# Patient Record
Sex: MALE | Race: WHITE | NOT HISPANIC OR LATINO
[De-identification: names, ages, dates, MRNs, and addresses within clinical notes are randomized per-mention and may not be internally consistent; named-entity substitution may affect disease eponyms.]

---

## 2023-08-27 ENCOUNTER — TRANSCRIPTION ENCOUNTER (OUTPATIENT)
Age: 63
End: 2023-08-27

## 2023-08-27 ENCOUNTER — INPATIENT (INPATIENT)
Facility: HOSPITAL | Age: 63
LOS: 2 days | Discharge: HOME | DRG: 660 | End: 2023-08-30
Attending: STUDENT IN AN ORGANIZED HEALTH CARE EDUCATION/TRAINING PROGRAM | Admitting: STUDENT IN AN ORGANIZED HEALTH CARE EDUCATION/TRAINING PROGRAM
Payer: MEDICAID

## 2023-08-27 VITALS
HEART RATE: 177 BPM | DIASTOLIC BLOOD PRESSURE: 57 MMHG | SYSTOLIC BLOOD PRESSURE: 94 MMHG | WEIGHT: 179.9 LBS | RESPIRATION RATE: 19 BRPM | OXYGEN SATURATION: 98 %

## 2023-08-27 DIAGNOSIS — I10 ESSENTIAL (PRIMARY) HYPERTENSION: ICD-10-CM

## 2023-08-27 DIAGNOSIS — Z29.9 ENCOUNTER FOR PROPHYLACTIC MEASURES, UNSPECIFIED: ICD-10-CM

## 2023-08-27 DIAGNOSIS — I20.0 UNSTABLE ANGINA: ICD-10-CM

## 2023-08-27 DIAGNOSIS — N20.0 CALCULUS OF KIDNEY: ICD-10-CM

## 2023-08-27 DIAGNOSIS — E78.5 HYPERLIPIDEMIA, UNSPECIFIED: ICD-10-CM

## 2023-08-27 DIAGNOSIS — N17.9 ACUTE KIDNEY FAILURE, UNSPECIFIED: ICD-10-CM

## 2023-08-27 LAB
ALBUMIN SERPL ELPH-MCNC: 3.9 G/DL — SIGNIFICANT CHANGE UP (ref 3.5–5)
ALP SERPL-CCNC: 140 U/L — HIGH (ref 40–120)
ALT FLD-CCNC: 136 U/L DA — HIGH (ref 10–60)
ANION GAP SERPL CALC-SCNC: 13 MMOL/L — SIGNIFICANT CHANGE UP (ref 5–17)
APPEARANCE UR: CLEAR — SIGNIFICANT CHANGE UP
APTT BLD: 41.8 SEC — HIGH (ref 24.5–35.6)
AST SERPL-CCNC: 109 U/L — HIGH (ref 10–40)
BACTERIA # UR AUTO: ABNORMAL /HPF
BASOPHILS # BLD AUTO: 0.04 K/UL — SIGNIFICANT CHANGE UP (ref 0–0.2)
BASOPHILS NFR BLD AUTO: 0.3 % — SIGNIFICANT CHANGE UP (ref 0–2)
BILIRUB SERPL-MCNC: 1.2 MG/DL — SIGNIFICANT CHANGE UP (ref 0.2–1.2)
BILIRUB UR-MCNC: NEGATIVE — SIGNIFICANT CHANGE UP
BUN SERPL-MCNC: 21 MG/DL — HIGH (ref 7–18)
CALCIUM SERPL-MCNC: 9.9 MG/DL — SIGNIFICANT CHANGE UP (ref 8.4–10.5)
CHLORIDE SERPL-SCNC: 102 MMOL/L — SIGNIFICANT CHANGE UP (ref 96–108)
CO2 SERPL-SCNC: 20 MMOL/L — LOW (ref 22–31)
COLOR SPEC: YELLOW — SIGNIFICANT CHANGE UP
CREAT SERPL-MCNC: 2.32 MG/DL — HIGH (ref 0.5–1.3)
DIFF PNL FLD: NEGATIVE — SIGNIFICANT CHANGE UP
EGFR: 31 ML/MIN/1.73M2 — LOW
EOSINOPHIL # BLD AUTO: 0.06 K/UL — SIGNIFICANT CHANGE UP (ref 0–0.5)
EOSINOPHIL NFR BLD AUTO: 0.4 % — SIGNIFICANT CHANGE UP (ref 0–6)
FLUAV AG NPH QL: SIGNIFICANT CHANGE UP
FLUBV AG NPH QL: SIGNIFICANT CHANGE UP
GLUCOSE SERPL-MCNC: 147 MG/DL — HIGH (ref 70–99)
GLUCOSE UR QL: NEGATIVE MG/DL — SIGNIFICANT CHANGE UP
HCT VFR BLD CALC: 51.6 % — HIGH (ref 39–50)
HGB BLD-MCNC: 17 G/DL — SIGNIFICANT CHANGE UP (ref 13–17)
IMM GRANULOCYTES NFR BLD AUTO: 0.3 % — SIGNIFICANT CHANGE UP (ref 0–0.9)
INR BLD: 0.94 RATIO — SIGNIFICANT CHANGE UP (ref 0.85–1.18)
KETONES UR-MCNC: 15 MG/DL
LEUKOCYTE ESTERASE UR-ACNC: NEGATIVE — SIGNIFICANT CHANGE UP
LYMPHOCYTES # BLD AUTO: 24.5 % — SIGNIFICANT CHANGE UP (ref 13–44)
LYMPHOCYTES # BLD AUTO: 3.58 K/UL — HIGH (ref 1–3.3)
MAGNESIUM SERPL-MCNC: 2.1 MG/DL — SIGNIFICANT CHANGE UP (ref 1.6–2.6)
MCHC RBC-ENTMCNC: 27.4 PG — SIGNIFICANT CHANGE UP (ref 27–34)
MCHC RBC-ENTMCNC: 32.9 GM/DL — SIGNIFICANT CHANGE UP (ref 32–36)
MCV RBC AUTO: 83.2 FL — SIGNIFICANT CHANGE UP (ref 80–100)
MONOCYTES # BLD AUTO: 1.12 K/UL — HIGH (ref 0–0.9)
MONOCYTES NFR BLD AUTO: 7.7 % — SIGNIFICANT CHANGE UP (ref 2–14)
NEUTROPHILS # BLD AUTO: 9.77 K/UL — HIGH (ref 1.8–7.4)
NEUTROPHILS NFR BLD AUTO: 66.8 % — SIGNIFICANT CHANGE UP (ref 43–77)
NITRITE UR-MCNC: NEGATIVE — SIGNIFICANT CHANGE UP
NRBC # BLD: 0 /100 WBCS — SIGNIFICANT CHANGE UP (ref 0–0)
NT-PROBNP SERPL-SCNC: 23 PG/ML — SIGNIFICANT CHANGE UP (ref 0–125)
PH UR: 5 — SIGNIFICANT CHANGE UP (ref 5–8)
PHOSPHATE SERPL-MCNC: 2.3 MG/DL — LOW (ref 2.5–4.5)
PLATELET # BLD AUTO: 331 K/UL — SIGNIFICANT CHANGE UP (ref 150–400)
POTASSIUM SERPL-MCNC: 4.2 MMOL/L — SIGNIFICANT CHANGE UP (ref 3.5–5.3)
POTASSIUM SERPL-SCNC: 4.2 MMOL/L — SIGNIFICANT CHANGE UP (ref 3.5–5.3)
PROT SERPL-MCNC: 8.8 G/DL — HIGH (ref 6–8.3)
PROT UR-MCNC: ABNORMAL MG/DL
PROTHROM AB SERPL-ACNC: 10.7 SEC — SIGNIFICANT CHANGE UP (ref 9.5–13)
RBC # BLD: 6.2 M/UL — HIGH (ref 4.2–5.8)
RBC # FLD: 13.8 % — SIGNIFICANT CHANGE UP (ref 10.3–14.5)
RBC CASTS # UR COMP ASSIST: 2 /HPF — SIGNIFICANT CHANGE UP (ref 0–4)
SARS-COV-2 RNA SPEC QL NAA+PROBE: SIGNIFICANT CHANGE UP
SODIUM SERPL-SCNC: 135 MMOL/L — SIGNIFICANT CHANGE UP (ref 135–145)
SP GR SPEC: 1.03 — SIGNIFICANT CHANGE UP (ref 1–1.03)
TROPONIN I, HIGH SENSITIVITY RESULT: 12.9 NG/L — SIGNIFICANT CHANGE UP
TROPONIN I, HIGH SENSITIVITY RESULT: 20 NG/L — SIGNIFICANT CHANGE UP
TROPONIN I, HIGH SENSITIVITY RESULT: 4.9 NG/L — SIGNIFICANT CHANGE UP
TSH SERPL-MCNC: 1.58 UU/ML — SIGNIFICANT CHANGE UP (ref 0.34–4.82)
UROBILINOGEN FLD QL: 1 MG/DL — SIGNIFICANT CHANGE UP (ref 0.2–1)
WBC # BLD: 14.62 K/UL — HIGH (ref 3.8–10.5)
WBC # FLD AUTO: 14.62 K/UL — HIGH (ref 3.8–10.5)
WBC UR QL: 2 /HPF — SIGNIFICANT CHANGE UP (ref 0–5)

## 2023-08-27 PROCEDURE — 99222 1ST HOSP IP/OBS MODERATE 55: CPT

## 2023-08-27 PROCEDURE — 99223 1ST HOSP IP/OBS HIGH 75: CPT | Mod: GC

## 2023-08-27 PROCEDURE — 99285 EMERGENCY DEPT VISIT HI MDM: CPT

## 2023-08-27 PROCEDURE — 71045 X-RAY EXAM CHEST 1 VIEW: CPT | Mod: 26

## 2023-08-27 PROCEDURE — 76775 US EXAM ABDO BACK WALL LIM: CPT | Mod: 26

## 2023-08-27 PROCEDURE — 74176 CT ABD & PELVIS W/O CONTRAST: CPT | Mod: 26

## 2023-08-27 PROCEDURE — 93010 ELECTROCARDIOGRAM REPORT: CPT

## 2023-08-27 RX ORDER — POLYETHYLENE GLYCOL 3350 17 G/17G
17 POWDER, FOR SOLUTION ORAL DAILY
Refills: 0 | Status: DISCONTINUED | OUTPATIENT
Start: 2023-08-27 | End: 2023-08-30

## 2023-08-27 RX ORDER — NALOXONE HYDROCHLORIDE 4 MG/.1ML
0.4 SPRAY NASAL ONCE
Refills: 0 | Status: DISCONTINUED | OUTPATIENT
Start: 2023-08-27 | End: 2023-08-30

## 2023-08-27 RX ORDER — SODIUM CHLORIDE 9 MG/ML
1000 INJECTION, SOLUTION INTRAVENOUS
Refills: 0 | Status: DISCONTINUED | OUTPATIENT
Start: 2023-08-27 | End: 2023-08-30

## 2023-08-27 RX ORDER — HEPARIN SODIUM 5000 [USP'U]/ML
5000 INJECTION INTRAVENOUS; SUBCUTANEOUS EVERY 12 HOURS
Refills: 0 | Status: DISCONTINUED | OUTPATIENT
Start: 2023-08-27 | End: 2023-08-28

## 2023-08-27 RX ORDER — ACETAMINOPHEN 500 MG
975 TABLET ORAL ONCE
Refills: 0 | Status: COMPLETED | OUTPATIENT
Start: 2023-08-27 | End: 2023-08-27

## 2023-08-27 RX ORDER — HYDROMORPHONE HYDROCHLORIDE 2 MG/ML
0.2 INJECTION INTRAMUSCULAR; INTRAVENOUS; SUBCUTANEOUS EVERY 4 HOURS
Refills: 0 | Status: DISCONTINUED | OUTPATIENT
Start: 2023-08-27 | End: 2023-08-29

## 2023-08-27 RX ORDER — LANOLIN ALCOHOL/MO/W.PET/CERES
3 CREAM (GRAM) TOPICAL AT BEDTIME
Refills: 0 | Status: DISCONTINUED | OUTPATIENT
Start: 2023-08-27 | End: 2023-08-30

## 2023-08-27 RX ORDER — METOPROLOL TARTRATE 50 MG
100 TABLET ORAL
Refills: 0 | Status: DISCONTINUED | OUTPATIENT
Start: 2023-08-27 | End: 2023-08-30

## 2023-08-27 RX ORDER — HYDROMORPHONE HYDROCHLORIDE 2 MG/ML
0.5 INJECTION INTRAMUSCULAR; INTRAVENOUS; SUBCUTANEOUS EVERY 4 HOURS
Refills: 0 | Status: DISCONTINUED | OUTPATIENT
Start: 2023-08-27 | End: 2023-08-29

## 2023-08-27 RX ORDER — ENOXAPARIN SODIUM 100 MG/ML
40 INJECTION SUBCUTANEOUS EVERY 24 HOURS
Refills: 0 | Status: DISCONTINUED | OUTPATIENT
Start: 2023-08-27 | End: 2023-08-27

## 2023-08-27 RX ORDER — MORPHINE SULFATE 50 MG/1
4 CAPSULE, EXTENDED RELEASE ORAL ONCE
Refills: 0 | Status: DISCONTINUED | OUTPATIENT
Start: 2023-08-27 | End: 2023-08-27

## 2023-08-27 RX ORDER — KETOROLAC TROMETHAMINE 30 MG/ML
15 SYRINGE (ML) INJECTION ONCE
Refills: 0 | Status: DISCONTINUED | OUTPATIENT
Start: 2023-08-27 | End: 2023-08-27

## 2023-08-27 RX ORDER — LOSARTAN POTASSIUM 100 MG/1
25 TABLET, FILM COATED ORAL DAILY
Refills: 0 | Status: DISCONTINUED | OUTPATIENT
Start: 2023-08-27 | End: 2023-08-27

## 2023-08-27 RX ORDER — SODIUM CHLORIDE 9 MG/ML
1000 INJECTION INTRAMUSCULAR; INTRAVENOUS; SUBCUTANEOUS ONCE
Refills: 0 | Status: COMPLETED | OUTPATIENT
Start: 2023-08-27 | End: 2023-08-27

## 2023-08-27 RX ORDER — TAMSULOSIN HYDROCHLORIDE 0.4 MG/1
0.4 CAPSULE ORAL AT BEDTIME
Refills: 0 | Status: DISCONTINUED | OUTPATIENT
Start: 2023-08-27 | End: 2023-08-30

## 2023-08-27 RX ORDER — ACETAMINOPHEN 500 MG
1000 TABLET ORAL ONCE
Refills: 0 | Status: COMPLETED | OUTPATIENT
Start: 2023-08-27 | End: 2023-08-27

## 2023-08-27 RX ORDER — ASPIRIN/CALCIUM CARB/MAGNESIUM 324 MG
81 TABLET ORAL DAILY
Refills: 0 | Status: DISCONTINUED | OUTPATIENT
Start: 2023-08-27 | End: 2023-08-28

## 2023-08-27 RX ORDER — SENNA PLUS 8.6 MG/1
2 TABLET ORAL AT BEDTIME
Refills: 0 | Status: DISCONTINUED | OUTPATIENT
Start: 2023-08-27 | End: 2023-08-30

## 2023-08-27 RX ORDER — ATORVASTATIN CALCIUM 80 MG/1
10 TABLET, FILM COATED ORAL AT BEDTIME
Refills: 0 | Status: DISCONTINUED | OUTPATIENT
Start: 2023-08-27 | End: 2023-08-30

## 2023-08-27 RX ORDER — METOPROLOL TARTRATE 50 MG
50 TABLET ORAL
Refills: 0 | Status: DISCONTINUED | OUTPATIENT
Start: 2023-08-27 | End: 2023-08-27

## 2023-08-27 RX ORDER — ACETAMINOPHEN 500 MG
650 TABLET ORAL EVERY 6 HOURS
Refills: 0 | Status: DISCONTINUED | OUTPATIENT
Start: 2023-08-27 | End: 2023-08-30

## 2023-08-27 RX ORDER — ONDANSETRON 8 MG/1
4 TABLET, FILM COATED ORAL EVERY 8 HOURS
Refills: 0 | Status: DISCONTINUED | OUTPATIENT
Start: 2023-08-27 | End: 2023-08-30

## 2023-08-27 RX ADMIN — Medication 100 MILLIGRAM(S): at 17:13

## 2023-08-27 RX ADMIN — Medication 1000 MILLIGRAM(S): at 16:56

## 2023-08-27 RX ADMIN — ATORVASTATIN CALCIUM 10 MILLIGRAM(S): 80 TABLET, FILM COATED ORAL at 21:52

## 2023-08-27 RX ADMIN — HYDROMORPHONE HYDROCHLORIDE 0.5 MILLIGRAM(S): 2 INJECTION INTRAMUSCULAR; INTRAVENOUS; SUBCUTANEOUS at 23:12

## 2023-08-27 RX ADMIN — SODIUM CHLORIDE 100 MILLILITER(S): 9 INJECTION, SOLUTION INTRAVENOUS at 16:04

## 2023-08-27 RX ADMIN — HYDROMORPHONE HYDROCHLORIDE 0.5 MILLIGRAM(S): 2 INJECTION INTRAMUSCULAR; INTRAVENOUS; SUBCUTANEOUS at 15:47

## 2023-08-27 RX ADMIN — Medication 975 MILLIGRAM(S): at 13:21

## 2023-08-27 RX ADMIN — MORPHINE SULFATE 4 MILLIGRAM(S): 50 CAPSULE, EXTENDED RELEASE ORAL at 11:57

## 2023-08-27 RX ADMIN — ENOXAPARIN SODIUM 40 MILLIGRAM(S): 100 INJECTION SUBCUTANEOUS at 15:47

## 2023-08-27 RX ADMIN — SODIUM CHLORIDE 1000 MILLILITER(S): 9 INJECTION INTRAMUSCULAR; INTRAVENOUS; SUBCUTANEOUS at 12:21

## 2023-08-27 RX ADMIN — TAMSULOSIN HYDROCHLORIDE 0.4 MILLIGRAM(S): 0.4 CAPSULE ORAL at 21:52

## 2023-08-27 RX ADMIN — SENNA PLUS 2 TABLET(S): 8.6 TABLET ORAL at 21:52

## 2023-08-27 RX ADMIN — Medication 400 MILLIGRAM(S): at 16:37

## 2023-08-27 RX ADMIN — HYDROMORPHONE HYDROCHLORIDE 0.5 MILLIGRAM(S): 2 INJECTION INTRAMUSCULAR; INTRAVENOUS; SUBCUTANEOUS at 23:30

## 2023-08-27 RX ADMIN — MORPHINE SULFATE 4 MILLIGRAM(S): 50 CAPSULE, EXTENDED RELEASE ORAL at 12:27

## 2023-08-27 RX ADMIN — HYDROMORPHONE HYDROCHLORIDE 0.5 MILLIGRAM(S): 2 INJECTION INTRAMUSCULAR; INTRAVENOUS; SUBCUTANEOUS at 16:08

## 2023-08-27 RX ADMIN — SODIUM CHLORIDE 100 MILLILITER(S): 9 INJECTION, SOLUTION INTRAVENOUS at 21:53

## 2023-08-27 RX ADMIN — SODIUM CHLORIDE 1000 MILLILITER(S): 9 INJECTION INTRAMUSCULAR; INTRAVENOUS; SUBCUTANEOUS at 11:21

## 2023-08-27 NOTE — ED PROVIDER NOTE - NS ED MD DISPO SPECIAL CONSIDERATION1
From: Kathy Amin  To: Joaquina Tomlin MD  Sent: 2/12/2020 11:46 AM CST  Subject: Lab Test or Test Related Question    Hello, I reviewed my test result for the ultra sound. Was there anything I should do to lower the triglycerides in my liver? I am changing my diet and exercising more. Also, I don't have to worry about the gall stones as long as they are not bothering me, correct?   None

## 2023-08-27 NOTE — H&P ADULT - NSICDXPASTMEDICALHX_GEN_ALL_CORE_FT
8 beats Junctional tach at a rate of 150. NII Hall aware, printed and documented.    PAST MEDICAL HISTORY:  HTN (hypertension)     Nephrolithiasis

## 2023-08-27 NOTE — ED PROVIDER NOTE - OBJECTIVE STATEMENT
63-year-old male with a past medical history of HTN, nephrolithiasis, takes aspirin daily secondary to unclear reasons, presents to the ED with 1 hour history of chest pain prior to arrival to the ED.  Chest pain started approximately 10 a.m. and was constant in nature.  Patient endorsed associated palpitations as well.  Chest pain was localized over the anterior chest described as a pressure-like sensation.  His chest pain was constant in nature until it self resolved upon arriving to the ED.  Initial triage EKG notable for SVT with a heart rate in the 170s that spontaneously resolved during the time of assessment.  No smoking, no daily alcohol use.  Does not see a cardiologist.  He denies any fevers, chills, nausea, vomiting, diarrhea, headaches.  He denies any other symptoms at bedside.

## 2023-08-27 NOTE — H&P ADULT - PROBLEM SELECTOR PLAN 3
Patient takes Losartan but does not know dosage  PRIMARY TEAM TO CONFIRM MED DOSAGE; will order medications and titrate up   [facility] [phone] Pre-renal due to SVT/Unstable Angina vs Renal Stones  IV Fluids  Trend BMP

## 2023-08-27 NOTE — H&P ADULT - ATTENDING COMMENTS
62 yo man presented to hospital after experiencing right flank pain x 4 days and acute chest pain this AM.   PMH HTN, HLD, nephrolithiasis.  In ED patient was found to have SVT.  When SVT broke, the chest pain resolved.  He continues to have right flank pain.     Alert, cooperative, flushed man, who appears in moderate pain  Vital Signs Last 24 Hrs  T(C): 37.4 (27 Aug 2023 15:46), Max: 37.4 (27 Aug 2023 15:46)  T(F): 99.4 (27 Aug 2023 15:46), Max: 99.4 (27 Aug 2023 15:46)  HR: 92 (27 Aug 2023 15:46) (87 - 177)  BP: 164/97 (27 Aug 2023 15:46) (94/57 - 164/97)  BP(mean): --  RR: 20 (27 Aug 2023 15:46) (19 - 20)  SpO2: 96% (27 Aug 2023 15:46) (94% - 98%)    Parameters below as of 27 Aug 2023 15:46  Patient On (Oxygen Delivery Method): room air    Lungs, clear  Cor, RRR  Abdomen, soft  Right flank, CVAT  Neurological, intact    EKG reviewed, SVT, later NSR with ST segment depressions in the lateral leads                          17.0   14.62 )-----------( 331      ( 27 Aug 2023 11:16 )             51.6   08-27    135  |  102  |  21<H>  ----------------------------<  147<H>  4.2   |  20<L>  |  2.32<H>    Ca    9.9      27 Aug 2023 11:16  Phos  2.3     08-27  Mg     2.1     08-27    TPro  8.8<H>  /  Alb  3.9  /  TBili  1.2  /  DBili  x   /  AST  109<H>  /  ALT  136<H>  /  AlkPhos  140<H>  08-27    Troponin I, High Sensitivity Result: 4.9:Troponin I, High Sensitivity Result: 20.0Red Blood Cell - Urine: 2 /HPF (08.27.23 @ 15:30) White Blood Cell - Urine: 2 /HPF (08.27.23 @ 15:30) Nitrite: Negative (08.27.23 @ 15:30)       < from: Xray Chest 1 View- PORTABLE-Urgent (Xray Chest 1 View- PORTABLE-Urgent .) (08.27.23 @ 11:45) >    COMPARISON: None available.    Heart likely enlarged.    Lungs are clear.    IMPRESSION: Cardiomegaly.    --- End of Report ---    < end of copied text >    < from: US Renal (08.27.23 @ 12:37) >    IMPRESSION:  Mild right hydronephrosis.    Bilateral nonobstructing renal calculi including 1.7 cm calculus in the   upper pole of the right kidney.    < end of copied text >    IMP:  Chest pain, transient SVT, now resolved.  Ischemic changes on EKG.  r/o NSTEMI.  Likely demand ischemia associated with pain.             Bilateral renal calculi.  Although there is no imaging evidence of obstruction, it is likely the cause of his pain.             CKD vs ASHLEY vs ASHLEY/CKD  Plan: Tele/troponin/echo/Cardiology, Dr. Mace has been called             Urology consultation, appreciated             CT adomen/pelvis             IV hydration             hypdromorphone for analgesia, as NSAIDS are contraindicated.

## 2023-08-27 NOTE — ED PROVIDER NOTE - PROGRESS NOTE DETAILS
In the interim post assessment, patient endorsed sudden onset of right-sided flank pain, consistent in nature to his prior episodes of nephrolithiasis, will order pain medication and ultrasound to assess.

## 2023-08-27 NOTE — H&P ADULT - PROBLEM SELECTOR PLAN 5
DVT Lovenox Takes statin but not sure dosage  PRIMARY TEAM TO CONFIRM MED DOSAGE; will order medications and titrate up   [facility] [phone]

## 2023-08-27 NOTE — ED ADULT NURSE NOTE - OBJECTIVE STATEMENT
Pt presents to ED accompanied by son. As per son, Pt had one episode of syncope at home and regained consciousness spontaneously.  Pt c/o of back pain. As per son Pt has hx of kidney stones.

## 2023-08-27 NOTE — CONSULT NOTE ADULT - ASSESSMENT
63-year-old male from home with a past medical history of HTN, HLD, nephrolithiasis found to have 1.4 cm right ureteral stone and bilateral nonobstructing renal stones on CT  -CT images reviewed demonstrating a 1.4 cm ureteral stone, bilateral large nonobstructing renal stones  -labs reviewed  -discussed with house staff  -NPO  -OR for urgent cystoscopy with right ureteral stent placement  -urine culture  -discussed with house staff

## 2023-08-27 NOTE — ED ADULT NURSE NOTE - NSFALLUNIVINTERV_ED_ALL_ED
Bed/Stretcher in lowest position, wheels locked, appropriate side rails in place/Call bell, personal items and telephone in reach/Instruct patient to call for assistance before getting out of bed/chair/stretcher/Non-slip footwear applied when patient is off stretcher/[place] to call system/Physically safe environment - no spills, clutter or unnecessary equipment/Purposeful proactive rounding/Room/bathroom lighting operational, light cord in reach

## 2023-08-27 NOTE — H&P ADULT - HISTORY OF PRESENT ILLNESS
63-year-old [place]ian male from home with a past medical history of HTN, HLD, nephrolithiasis, no known cardiac history, presents to the ED with 1 hour history of chest pain prior to arrival to the ED.  Patient had been dealing with right sided flank pain due to renal stone for the past 4 days that progressed today with chest pain started approximately 10 a.m. and was constant in nature.  Patient endorsed associated palpitations with dizziness as well.  His chest pain was constant in nature until it self resolved upon arriving to the ED.      Patient denies headache, NVD, shortness of breath, urinary symptoms No smoking, no daily alcohol use.  Does not see a cardiologist.    In the ED EKG notable for SVT with a heart rate in the 170s that spontaneously resolved.    Renal ultrasound showed: Mild right hydronephrosis. Bilateral nonobstructing renal calculi including 1.7 cm calculus in the   upper pole of the right kidney. CXR showed cardiomegaly. WBC 14K. Trop Negative   63-year-old Mongolian speaking male from home with a past medical history of HTN, HLD, nephrolithiasis, no known cardiac history, presents to the ED with 1 hour history of chest pain prior to arrival to the ED.  Patient had been dealing with right sided flank pain due to renal stone for the past 4 days that progressed today with chest pain started approximately 10 a.m. and was constant in nature.  Patient endorsed associated palpitations with dizziness as well.  His chest pain was constant in nature until it self resolved upon arriving to the ED.      Patient denies headache, NVD, shortness of breath, urinary symptoms No smoking, no daily alcohol use.  Does not see a cardiologist.    In the ED EKG notable for SVT with a heart rate in the 170s that spontaneously resolved.    Renal ultrasound showed: Mild right hydronephrosis. Bilateral nonobstructing renal calculi including 1.7 cm calculus in the   upper pole of the right kidney. CXR showed cardiomegaly. WBC 14K. Trop Negative

## 2023-08-27 NOTE — H&P ADULT - PROBLEM SELECTOR PLAN 4
Takes statin but not sure dosage  PRIMARY TEAM TO CONFIRM MED DOSAGE; will order medications and titrate up   [facility] [phone] Patient takes Losartan but does not know dosage  PRIMARY TEAM TO CONFIRM MED DOSAGE; will order medications and titrate up   [facility] [phone]  Holding Home Losartan in the setting of ASHLEY

## 2023-08-27 NOTE — H&P ADULT - PROBLEM SELECTOR PLAN 2
P/w with right sided flank pain tender to palpation  Hx of multiple kidney stones; requiring surgery 15 years ago  Renal ultrasound Mild right hydronephrosis. Bilateral nonobstructive renal calculi including 1.7 cm calculus in the   upper pole of the right kidney.  Based on location (upper pole) size, less than <20mm; will treat conservatively with IV fluids, pain medications P/w with right sided flank pain tender to palpation  Hx of multiple kidney stones; requiring surgery 15 years ago  Renal ultrasound Mild right hydronephrosis. Bilateral nonobstructive renal calculi including 1.7 cm calculus in the   upper pole of the right kidney.  Based on location (upper pole) size, less than <20mm; will treat conservatively with IV fluids, pain medications  [ ] Tamsulosin   [ ] Pain medications  [ ] UA P/w with right sided flank pain tender to palpation  Hx of multiple kidney stones; requiring surgery 15 years ago  Renal ultrasound Mild right hydronephrosis. Bilateral nonobstructive renal calculi including 1.7 cm calculus in the   upper pole of the right kidney.  Based on location (upper pole) size, less than <20mm; will treat conservatively with IV fluids, pain medications  [ ] Tamsulosin   [ ] Pain medications  [ ] UA  [ ] Urology consulted P/w with right sided flank pain tender to palpation  Hx of multiple kidney stones; requiring surgery 15 years ago  Renal ultrasound Mild right hydronephrosis. Bilateral nonobstructive renal calculi including 1.7 cm calculus in the   upper pole of the right kidney.  Based on location (upper pole) size, less than <20mm; will treat conservatively with IV fluids, pain medications  [ ] Tamsulosin  0.4mg  [ ] Pain medications Dilaudid; holding NSAIDs in the setting of ASHLEY  [ ] UA  [ ] Urology consulted P/w with right sided flank pain tender to palpation  Hx of multiple kidney stones; requiring surgery 15 years ago  Renal ultrasound Mild right hydronephrosis. Bilateral nonobstructive renal calculi including 1.7 cm calculus in the   upper pole of the right kidney.  Based on location (upper pole) size, less than <20mm; will treat conservatively with IV fluids, pain medications  [ ] Tamsulosin  0.4mg  [ ] Pain medications Dilaudid; holding NSAIDs in the setting of ASHLEY  [ ] UA  [ ] Urology consulted: appreciate recs; ordered CT Abdomen Pelvis No Contrast

## 2023-08-27 NOTE — H&P ADULT - NSHPPHYSICALEXAM_GEN_ALL_CORE
GENERAL: NAD, middle aged; well nourished  HEAD:  Atraumatic, Normocephalic, red skin tone compared to body; normal per patient and son  EYES: EOMI, PERRLA, conjunctiva and sclera clear  ENMT: No tonsillar erythema, exudates, or enlargement;   NECK: Supple, normal appearance, No JVD;  NERVOUS SYSTEM:  Alert & Oriented X3, Moves all extremities spontaneously  CHEST/LUNG: Lungs clear to auscultation bilaterally,   HEART: Regular rate and rhythm; No murmurs, rubs, or gallops  ABDOMEN: Soft, Nontender, Nondistended; Bowel sounds present  EXTREMITIES:  2+ Peripheral Pulses, No clubbing, cyanosis, or edema  LYMPH: No lymphadenopathy noted  SKIN: No rashes or lesions;  Good capillary refill

## 2023-08-27 NOTE — H&P ADULT - PROBLEM SELECTOR PLAN 1
Patient presents with chest pain dizziness and weakness concerning for unstable angina; never happened before  No hx of CAD, does not follow cardiologist  EKG shows some T Wave inversions leads III, aVF  SVT resolved on admission  Admit to Telemetry  ECHO  Trend Troponin Q8  Cardiology Consult Patient presents with chest pain dizziness and weakness concerning for unstable angina; never happened before  No hx of CAD, does not follow cardiologist  EKG shows some T Wave inversions leads III, aVF  SVT resolved on admission  Admit to Telemetry  ECHO  Trend Troponin Q8  Cardiology Consult: Amin

## 2023-08-27 NOTE — H&P ADULT - ASSESSMENT
63-year-old [place] speaking male from home with a past medical history of HTN, HLD, nephrolithiasis, no known cardiac history, presents to the ED with 1 hour history of chest pain prior to arrival to the ED concerning for unstable angina; found to have non-obstructive kidney stone; admitting for ACS rule out and R nephrolithiasis     PRIMARY TEAM TO CONFIRM MED DOSAGE; will order medications and titrate up   [facility] [phone] 63-year-old [place] speaking male from home with a past medical history of HTN, HLD, nephrolithiasis (stone removal 15 years ago), no known cardiac history, presents to the ED with 1 hour history of chest pain prior to arrival to the ED concerning for unstable angina; found to have non-obstructive kidney stone; admitting for ACS rule out and R nephrolithiasis     PRIMARY TEAM TO CONFIRM MED DOSAGE; will order medications and titrate up   [facility] [phone]

## 2023-08-27 NOTE — ED PROVIDER NOTE - CLINICAL SUMMARY MEDICAL DECISION MAKING FREE TEXT BOX
63-year-old male with a past medical history of HTN, nephrolithiasis, takes aspirin daily secondary to unclear reasons, presents to the ED with 1 hour history of chest pain prior to arrival to the ED. Initial EKG notable for a SVT with a heart rate in the 170s.  No DE interval noted.  No P waves.  At the time of initial assessment, SVT spontaneously resolved.  EKG as noted below.:  Heart rate 95 bpm's, DE interval 154 ms, QRS 78 ms.  No acute ischemic changes.  Isolated T wave inversion in lead III.  No STEMI.  Initial vitals reviewed and otherwise nonactionable during the time of evaluation.  Physical exam as noted above.  Well-appearing male without acute distress.  Abdomen is soft nontender without guarding rebound tenderness.  No CVA tenderness bilaterally.  Monitor demonstrating sinus rhythm.  Differential diagnosis includes but not limited to ACS versus stable angina versus viral syndrome.  Will order labs, EKG, meds, imaging, and reassess.  Will likely admit patient.

## 2023-08-27 NOTE — CONSULT NOTE ADULT - SUBJECTIVE AND OBJECTIVE BOX
HPI:  63-year-old New Zealander speaking male from home with a past medical history of HTN, HLD, nephrolithiasis, no known cardiac history, presents to the ED with 1 hour history of chest pain prior to arrival to the ED.  Patient had been dealing with right sided flank pain due to renal stone for the past 4 days that progressed today with chest pain started approximately 10 a.m. and was constant in nature.  Patient endorsed associated palpitations with dizziness as well.  His chest pain was constant in nature until it self resolved upon arriving to the ED.      Patient denies headache, NVD, shortness of breath, urinary symptoms No smoking, no daily alcohol use.  Does not see a cardiologist.    In the ED EKG notable for SVT with a heart rate in the 170s that spontaneously resolved.    Renal ultrasound showed: Mild right hydronephrosis. Bilateral nonobstructing renal calculi including 1.7 cm calculus in the   upper pole of the right kidney. CXR showed cardiomegaly. WBC 14K. Trop Negative   (27 Aug 2023 14:10)    Urology consulted for R kidney findings on US  Pt with creat ^ 2.32, pt unsure of basline renal function  does not see nephrologist  admitted to med service r/o ACS    PAST MEDICAL & SURGICAL HISTORY:  HTN (hypertension)      Nephrolithiasis          Vital Signs Last 24 Hrs  T(C): 37.4 (27 Aug 2023 15:46), Max: 37.4 (27 Aug 2023 15:46)  T(F): 99.4 (27 Aug 2023 15:46), Max: 99.4 (27 Aug 2023 15:46)  HR: 92 (27 Aug 2023 15:46) (87 - 177)  BP: 164/97 (27 Aug 2023 15:46) (94/57 - 164/97)  BP(mean): --  RR: 20 (27 Aug 2023 15:46) (19 - 20)  SpO2: 96% (27 Aug 2023 15:46) (94% - 98%)    Parameters below as of 27 Aug 2023 15:46  Patient On (Oxygen Delivery Method): room air                              17.0   14.62 )-----------( 331      ( 27 Aug 2023 11:16 )             51.6     08-27    135  |  102  |  21<H>  ----------------------------<  147<H>  4.2   |  20<L>  |  2.32<H>    Ca    9.9      27 Aug 2023 11:16  Phos  2.3     08-27  Mg     2.1     08-27    TPro  8.8<H>  /  Alb  3.9  /  TBili  1.2  /  DBili  x   /  AST  109<H>  /  ALT  136<H>  /  AlkPhos  140<H>  08-27    PT/INR - ( 27 Aug 2023 11:16 )   PT: 10.7 sec;   INR: 0.94 ratio         PTT - ( 27 Aug 2023 11:16 )  PTT:41.8 sec    PHYSICAL EXAM  General: WN/WD NAD  Neurology: A&Ox3, nonfocal, SHOEMAKER x 4  Respiratory: CTA B/L  CV: S1S2  Abdominal: Soft, NT, ND, mild, improved R flank discomfort  Extremities: No edema, + peripheral pulses        ASSESSMENT/ PLAN:  63-year-old [place] speaking male from home with a past medical history of HTN, HLD, nephrolithiasis, no known cardiac history, presents to the ED with 1 hour history of chest pain prior to arrival to the ED.  Patient had been dealing with right sided flank pain due to renal stone for the past 4 days that progressed today with chest pain started approximately 10 a.m. and was constant in nature.  Patient endorsed associated palpitations with dizziness as well.  His chest pain was constant in nature until it self resolved upon arriving to the ED.      Patient denies headache, NVD, shortness of breath, urinary symptoms No smoking, no daily alcohol use.  Does not see a cardiologist.    In the ED EKG notable for SVT with a heart rate in the 170s that spontaneously resolved.    Renal ultrasound showed: Mild right hydronephrosis. Bilateral nonobstructing renal calculi including 1.7 cm calculus in the   upper pole of the right kidney. CXR showed cardiomegaly. WBC 14K. Trop Negative   (27 Aug 2023 14:10)    Urology consulted for R kidney findings on US  Pt with creat ^ 2.32, pt unsure of basline renal function  does not see nephrologist  admitted to med service r/o ACS    elevated creat poss related to SVT, pre-renal  case discussed with Dr Crawford, urology  cont medical/cardiac work-up  will need CT non con abd pelvis  will follow HPI:  63-year-old Salvadorean speaking male from home with a past medical history of HTN, HLD, nephrolithiasis, no known cardiac history, presents to the ED with 1 hour history of chest pain prior to arrival to the ED.  Patient had been dealing with right sided flank pain due to renal stone for the past 4 days that progressed today with chest pain started approximately 10 a.m. and was constant in nature.  Patient endorsed associated palpitations with dizziness as well.  His chest pain was constant in nature until it self resolved upon arriving to the ED.      Patient denies headache, NVD, shortness of breath, urinary symptoms No smoking, no daily alcohol use.  Does not see a cardiologist.    In the ED EKG notable for SVT with a heart rate in the 170s that spontaneously resolved.    Renal ultrasound showed: Mild right hydronephrosis. Bilateral nonobstructing renal calculi including 1.7 cm calculus in the   upper pole of the right kidney. CXR showed cardiomegaly. WBC 14K. Trop Negative   (27 Aug 2023 14:10)    Urology consulted for R kidney findings on US  Pt with creat ^ 2.32, pt unsure of baseline renal function  does not see nephrologist  admitted to med service r/o ACS    No specific timing to his symptoms. No aggravating or alleviating factors that he knows of.    PAST MEDICAL & SURGICAL HISTORY:  HTN (hypertension)  Nephrolithiasis    Family History: No family history of  malignancy  Social History: Does not smoke    ROS: All others negative except as noted above.    MEDICATIONS  (STANDING):  aspirin  chewable 81 milliGRAM(s) Oral daily  atorvastatin 10 milliGRAM(s) Oral at bedtime  chlorhexidine 2% Cloths 1 Application(s) Topical daily  heparin   Injectable 5000 Unit(s) SubCutaneous every 12 hours  lactated ringers. 1000 milliLiter(s) (100 mL/Hr) IV Continuous <Continuous>  metoprolol tartrate 100 milliGRAM(s) Oral two times a day  naloxone Injectable 0.4 milliGRAM(s) IV Push once  polyethylene glycol 3350 17 Gram(s) Oral daily  senna 2 Tablet(s) Oral at bedtime  tamsulosin 0.4 milliGRAM(s) Oral at bedtime    MEDICATIONS  (PRN):  acetaminophen     Tablet .. 650 milliGRAM(s) Oral every 6 hours PRN Temp greater or equal to 38C (100.4F), Mild Pain (1 - 3)  aluminum hydroxide/magnesium hydroxide/simethicone Suspension 30 milliLiter(s) Oral every 4 hours PRN Dyspepsia  bisacodyl 5 milliGRAM(s) Oral daily PRN Constipation  HYDROmorphone  Injectable 0.2 milliGRAM(s) IV Push every 4 hours PRN Moderate Pain (4 - 6)  HYDROmorphone  Injectable 0.5 milliGRAM(s) IV Push every 4 hours PRN Severe Pain (7 - 10)  melatonin 3 milliGRAM(s) Oral at bedtime PRN Insomnia  ondansetron Injectable 4 milliGRAM(s) IV Push every 8 hours PRN Nausea and/or Vomiting    Allergies    No Known Allergies    Intolerances            Vital Signs Last 24 Hrs  T(C): 37.4 (27 Aug 2023 15:46), Max: 37.4 (27 Aug 2023 15:46)  T(F): 99.4 (27 Aug 2023 15:46), Max: 99.4 (27 Aug 2023 15:46)  HR: 92 (27 Aug 2023 15:46) (87 - 177)  BP: 164/97 (27 Aug 2023 15:46) (94/57 - 164/97)  BP(mean): --  RR: 20 (27 Aug 2023 15:46) (19 - 20)  SpO2: 96% (27 Aug 2023 15:46) (94% - 98%)    Parameters below as of 27 Aug 2023 15:46  Patient On (Oxygen Delivery Method): room air                              17.0   14.62 )-----------( 331      ( 27 Aug 2023 11:16 )             51.6     08-27    135  |  102  |  21<H>  ----------------------------<  147<H>  4.2   |  20<L>  |  2.32<H>    Ca    9.9      27 Aug 2023 11:16  Phos  2.3     08-27  Mg     2.1     08-27    TPro  8.8<H>  /  Alb  3.9  /  TBili  1.2  /  DBili  x   /  AST  109<H>  /  ALT  136<H>  /  AlkPhos  140<H>  08-27    PT/INR - ( 27 Aug 2023 11:16 )   PT: 10.7 sec;   INR: 0.94 ratio         PTT - ( 27 Aug 2023 11:16 )  PTT:41.8 sec        ACC: [id number] EXAM:  CT ABDOMEN AND PELVIS   ORDERED BY: EVAN GONZÁLEZCHARLOTTE     PROCEDURE DATE:  08/27/2023          INTERPRETATION:  CLINICAL INFORMATION: 1.7cm renal stone in upper pole   right kidney    COMPARISON: Abdominal ultrasound August 27, 2023.    CONTRAST/COMPLICATIONS:  IV Contrast: NONE  Oral Contrast: NONE  Complications: None reported at time of study completion    PROCEDURE:  CT of the Abdomen and Pelvis was performed.  Sagittal and coronal reformats were performed.    FINDINGS:  LOWER CHEST: Mild bibasilar subsegmental atelectasis.    LIVER: Steatosis.  BILE DUCTS: Normal caliber.  GALLBLADDER: Within normal limits.  SPLEEN: Within normal limits.  PANCREAS: Within normal limits.  ADRENALS: Within normal limits.  KIDNEYS/URETERS: 3.7 x 2.0 cm staghorn type calculus within the upper   pole of the right kidney. Adjacent cystic area upper pole right kidney   containing 4.2 mm calcification. Moderate right hydronephrosis appears   secondary to a 1.4 x 0.7 cm stone within the midportion of the right   ureter coronal image 4-63/sagittal image 6-70. No perinephric fluid   collections.    7.8 mm nonobstructing stone midportion left kidney. No hydronephrosis. No   left ureteral stones.    BLADDER: Within normal limits.  REPRODUCTIVE ORGANS: Prostate within normal limits.    Diverticulosis without acute diverticulitis.    BOWEL: No bowel obstruction. Appendix is not visualized. No evidence of   inflammation in the pericecal region.  PERITONEUM: No ascites.  No free air or abscess.  VESSELS: Within normal limits.  RETROPERITONEUM/LYMPH NODES: No lymphadenopathy.  ABDOMINAL WALL: Within normal limits.  BONES: Within normal limits. No lytic or blastic process.    IMPRESSION:  3.7 x 2.0 cm staghorn type calculus within the upperpole of the right   kidney. Adjacent cystic area upper pole right kidney containing 4.2 mm   calcification. Moderate right hydronephrosis appears secondary to a 1.4 x   0.7 cm stone within the midportion of the right ureter. No perinephric   fluid collections. Urologic consultation recommended at this time.    7.8 mm nonobstructing stone midportion left kidney. No hydronephrosis. No   left ureteral stones.    Please refer to detailed findings otherwise described above.    --- End of Report ---             SMITA BRANNON MD; Attending Radiologist  This document has been electronically signed. Aug 27 2023  8:07PM

## 2023-08-28 LAB
A1C WITH ESTIMATED AVERAGE GLUCOSE RESULT: 6.2 % — HIGH (ref 4–5.6)
ALBUMIN SERPL ELPH-MCNC: 3.2 G/DL — LOW (ref 3.5–5)
ALP SERPL-CCNC: 131 U/L — HIGH (ref 40–120)
ALT FLD-CCNC: 109 U/L DA — HIGH (ref 10–60)
ANION GAP SERPL CALC-SCNC: 11 MMOL/L — SIGNIFICANT CHANGE UP (ref 5–17)
AST SERPL-CCNC: 58 U/L — HIGH (ref 10–40)
BASOPHILS # BLD AUTO: 0.03 K/UL — SIGNIFICANT CHANGE UP (ref 0–0.2)
BASOPHILS NFR BLD AUTO: 0.2 % — SIGNIFICANT CHANGE UP (ref 0–2)
BILIRUB SERPL-MCNC: 1.1 MG/DL — SIGNIFICANT CHANGE UP (ref 0.2–1.2)
BUN SERPL-MCNC: 21 MG/DL — HIGH (ref 7–18)
CALCIUM SERPL-MCNC: 9.6 MG/DL — SIGNIFICANT CHANGE UP (ref 8.4–10.5)
CHLORIDE SERPL-SCNC: 101 MMOL/L — SIGNIFICANT CHANGE UP (ref 96–108)
CHOLEST SERPL-MCNC: 169 MG/DL — SIGNIFICANT CHANGE UP
CO2 SERPL-SCNC: 22 MMOL/L — SIGNIFICANT CHANGE UP (ref 22–31)
CREAT SERPL-MCNC: 1.67 MG/DL — HIGH (ref 0.5–1.3)
EGFR: 46 ML/MIN/1.73M2 — LOW
EOSINOPHIL # BLD AUTO: 0.02 K/UL — SIGNIFICANT CHANGE UP (ref 0–0.5)
EOSINOPHIL NFR BLD AUTO: 0.1 % — SIGNIFICANT CHANGE UP (ref 0–6)
ESTIMATED AVERAGE GLUCOSE: 131 MG/DL — HIGH (ref 68–114)
GLUCOSE SERPL-MCNC: 110 MG/DL — HIGH (ref 70–99)
HCT VFR BLD CALC: 45.8 % — SIGNIFICANT CHANGE UP (ref 39–50)
HCV AB S/CO SERPL IA: 0.08 S/CO — SIGNIFICANT CHANGE UP (ref 0–0.99)
HCV AB SERPL-IMP: SIGNIFICANT CHANGE UP
HDLC SERPL-MCNC: 42 MG/DL — SIGNIFICANT CHANGE UP
HGB BLD-MCNC: 15 G/DL — SIGNIFICANT CHANGE UP (ref 13–17)
IMM GRANULOCYTES NFR BLD AUTO: 1.2 % — HIGH (ref 0–0.9)
LIPID PNL WITH DIRECT LDL SERPL: 97 MG/DL — SIGNIFICANT CHANGE UP
LYMPHOCYTES # BLD AUTO: 14.5 % — SIGNIFICANT CHANGE UP (ref 13–44)
LYMPHOCYTES # BLD AUTO: 2.02 K/UL — SIGNIFICANT CHANGE UP (ref 1–3.3)
MCHC RBC-ENTMCNC: 27.7 PG — SIGNIFICANT CHANGE UP (ref 27–34)
MCHC RBC-ENTMCNC: 32.8 GM/DL — SIGNIFICANT CHANGE UP (ref 32–36)
MCV RBC AUTO: 84.7 FL — SIGNIFICANT CHANGE UP (ref 80–100)
MONOCYTES # BLD AUTO: 1.28 K/UL — HIGH (ref 0–0.9)
MONOCYTES NFR BLD AUTO: 9.2 % — SIGNIFICANT CHANGE UP (ref 2–14)
NEUTROPHILS # BLD AUTO: 10.39 K/UL — HIGH (ref 1.8–7.4)
NEUTROPHILS NFR BLD AUTO: 74.8 % — SIGNIFICANT CHANGE UP (ref 43–77)
NON HDL CHOLESTEROL: 127 MG/DL — SIGNIFICANT CHANGE UP
NRBC # BLD: 0 /100 WBCS — SIGNIFICANT CHANGE UP (ref 0–0)
PLATELET # BLD AUTO: 257 K/UL — SIGNIFICANT CHANGE UP (ref 150–400)
POTASSIUM SERPL-MCNC: 4 MMOL/L — SIGNIFICANT CHANGE UP (ref 3.5–5.3)
POTASSIUM SERPL-SCNC: 4 MMOL/L — SIGNIFICANT CHANGE UP (ref 3.5–5.3)
PROT SERPL-MCNC: 8 G/DL — SIGNIFICANT CHANGE UP (ref 6–8.3)
RBC # BLD: 5.41 M/UL — SIGNIFICANT CHANGE UP (ref 4.2–5.8)
RBC # FLD: 13.6 % — SIGNIFICANT CHANGE UP (ref 10.3–14.5)
SODIUM SERPL-SCNC: 134 MMOL/L — LOW (ref 135–145)
TRIGL SERPL-MCNC: 148 MG/DL — SIGNIFICANT CHANGE UP
WBC # BLD: 13.91 K/UL — HIGH (ref 3.8–10.5)
WBC # FLD AUTO: 13.91 K/UL — HIGH (ref 3.8–10.5)

## 2023-08-28 PROCEDURE — 52332 CYSTOSCOPY AND TREATMENT: CPT | Mod: RT

## 2023-08-28 PROCEDURE — 99232 SBSQ HOSP IP/OBS MODERATE 35: CPT | Mod: GC

## 2023-08-28 PROCEDURE — 74420 UROGRAPHY RTRGR +-KUB: CPT | Mod: 26

## 2023-08-28 PROCEDURE — 93306 TTE W/DOPPLER COMPLETE: CPT | Mod: 26

## 2023-08-28 PROCEDURE — 99223 1ST HOSP IP/OBS HIGH 75: CPT | Mod: 25

## 2023-08-28 DEVICE — KIT URET PERFLX PLUS 6FRX24CM: Type: IMPLANTABLE DEVICE | Status: FUNCTIONAL

## 2023-08-28 DEVICE — URETERAL STENT PERCUFLEX PLUS 6FR 24CM: Type: IMPLANTABLE DEVICE | Status: FUNCTIONAL

## 2023-08-28 DEVICE — GUIDEWIRE SENSOR DUAL-FLEX NITINOL STRAIGHT .035" X 150CM: Type: IMPLANTABLE DEVICE | Status: FUNCTIONAL

## 2023-08-28 DEVICE — URETERAL CATH OPEN END FLEXI-TIP 5FR .038" X 70CM: Type: IMPLANTABLE DEVICE | Status: FUNCTIONAL

## 2023-08-28 RX ORDER — FENTANYL CITRATE 50 UG/ML
25 INJECTION INTRAVENOUS
Refills: 0 | Status: DISCONTINUED | OUTPATIENT
Start: 2023-08-28 | End: 2023-08-28

## 2023-08-28 RX ORDER — METOCLOPRAMIDE HCL 10 MG
10 TABLET ORAL ONCE
Refills: 0 | Status: DISCONTINUED | OUTPATIENT
Start: 2023-08-28 | End: 2023-08-28

## 2023-08-28 RX ORDER — CHLORHEXIDINE GLUCONATE 213 G/1000ML
1 SOLUTION TOPICAL DAILY
Refills: 0 | Status: DISCONTINUED | OUTPATIENT
Start: 2023-08-28 | End: 2023-08-29

## 2023-08-28 RX ORDER — CEFTRIAXONE 500 MG/1
1000 INJECTION, POWDER, FOR SOLUTION INTRAMUSCULAR; INTRAVENOUS EVERY 24 HOURS
Refills: 0 | Status: DISCONTINUED | OUTPATIENT
Start: 2023-08-28 | End: 2023-08-30

## 2023-08-28 RX ORDER — ACETAMINOPHEN 500 MG
650 TABLET ORAL ONCE
Refills: 0 | Status: COMPLETED | OUTPATIENT
Start: 2023-08-28 | End: 2023-08-28

## 2023-08-28 RX ORDER — LOSARTAN POTASSIUM 100 MG/1
1 TABLET, FILM COATED ORAL
Refills: 0 | DISCHARGE

## 2023-08-28 RX ORDER — CEFTRIAXONE 500 MG/1
2000 INJECTION, POWDER, FOR SOLUTION INTRAMUSCULAR; INTRAVENOUS EVERY 24 HOURS
Refills: 0 | Status: DISCONTINUED | OUTPATIENT
Start: 2023-08-28 | End: 2023-08-28

## 2023-08-28 RX ORDER — LIDOCAINE 4 G/100G
1 CREAM TOPICAL ONCE
Refills: 0 | Status: COMPLETED | OUTPATIENT
Start: 2023-08-28 | End: 2023-08-28

## 2023-08-28 RX ORDER — FENTANYL CITRATE 50 UG/ML
50 INJECTION INTRAVENOUS
Refills: 0 | Status: DISCONTINUED | OUTPATIENT
Start: 2023-08-28 | End: 2023-08-28

## 2023-08-28 RX ORDER — ATORVASTATIN CALCIUM 80 MG/1
1 TABLET, FILM COATED ORAL
Refills: 0 | DISCHARGE

## 2023-08-28 RX ADMIN — ATORVASTATIN CALCIUM 10 MILLIGRAM(S): 80 TABLET, FILM COATED ORAL at 22:09

## 2023-08-28 RX ADMIN — Medication 81 MILLIGRAM(S): at 13:43

## 2023-08-28 RX ADMIN — HYDROMORPHONE HYDROCHLORIDE 0.5 MILLIGRAM(S): 2 INJECTION INTRAMUSCULAR; INTRAVENOUS; SUBCUTANEOUS at 05:00

## 2023-08-28 RX ADMIN — Medication 100 MILLIGRAM(S): at 05:42

## 2023-08-28 RX ADMIN — CHLORHEXIDINE GLUCONATE 1 APPLICATION(S): 213 SOLUTION TOPICAL at 10:05

## 2023-08-28 RX ADMIN — TAMSULOSIN HYDROCHLORIDE 0.4 MILLIGRAM(S): 0.4 CAPSULE ORAL at 22:09

## 2023-08-28 RX ADMIN — LIDOCAINE 1 PATCH: 4 CREAM TOPICAL at 01:30

## 2023-08-28 RX ADMIN — Medication 650 MILLIGRAM(S): at 10:37

## 2023-08-28 RX ADMIN — CEFTRIAXONE 100 MILLIGRAM(S): 500 INJECTION, POWDER, FOR SOLUTION INTRAMUSCULAR; INTRAVENOUS at 22:10

## 2023-08-28 RX ADMIN — HYDROMORPHONE HYDROCHLORIDE 0.5 MILLIGRAM(S): 2 INJECTION INTRAMUSCULAR; INTRAVENOUS; SUBCUTANEOUS at 04:47

## 2023-08-28 RX ADMIN — SODIUM CHLORIDE 100 MILLILITER(S): 9 INJECTION, SOLUTION INTRAVENOUS at 05:42

## 2023-08-28 RX ADMIN — Medication 100 MILLIGRAM(S): at 17:15

## 2023-08-28 RX ADMIN — Medication 260 MILLIGRAM(S): at 10:05

## 2023-08-28 RX ADMIN — POLYETHYLENE GLYCOL 3350 17 GRAM(S): 17 POWDER, FOR SOLUTION ORAL at 13:44

## 2023-08-28 RX ADMIN — LIDOCAINE 1 PATCH: 4 CREAM TOPICAL at 07:52

## 2023-08-28 RX ADMIN — LIDOCAINE 1 PATCH: 4 CREAM TOPICAL at 13:56

## 2023-08-28 NOTE — PROGRESS NOTE ADULT - ASSESSMENT
63-year-old Australian speaking male from home with a past medical history of HTN, HLD, nephrolithiasis (stone removal 15 years ago), no known cardiac history, presents to the ED with 1 hour history of chest pain prior to arrival to the ED concerning for unstable angina; found to have non-obstructive kidney stone; admitting for ACS rule out and R nephrolithiasis

## 2023-08-28 NOTE — PATIENT PROFILE ADULT - FALL HARM RISK - RISK INTERVENTIONS
Assistance OOB with selected safe patient handling equipment/Monitor gait and stability/Reinforce activity limits and safety measures with patient and family/Review medications for side effects contributing to fall risk/Sit up slowly, dangle for a short time, stand at bedside before walking/Bed in lowest position, wheels locked, appropriate side rails in place/Call bell, personal items and telephone in reach/Instruct patient to call for assistance before getting out of bed or chair/Non-slip footwear when patient is out of bed/[place] to call system/Physically safe environment - no spills, clutter or unnecessary equipment/Purposeful Proactive Rounding/Room/bathroom lighting operational, light cord in reach

## 2023-08-28 NOTE — PROGRESS NOTE ADULT - SUBJECTIVE AND OBJECTIVE BOX
Surgery    Subjective:  Pt resting comfortably. c/o dysuria and mild hematuria  No further R flank pain.  Voided post op.    T(C): 36.6 (08-28-23 @ 16:48), Max: 37.5 (08-28-23 @ 05:09)  HR: 78 (08-28-23 @ 16:48) (71 - 93)  BP: 129/84 (08-28-23 @ 16:48) (123/80 - 150/87)  RR: 18 (08-28-23 @ 16:48) (10 - 18)  SpO2: 96% (08-28-23 @ 16:48) (94% - 99%)    Physical:  Gen: A&O x3  Abd: Soft ND, NT  Back: No CVAT b/l    63y.o. Male s/p cysto, stent    -Abx  -f/u C&S  -Diet as tolerated  -Pain control prn  -DVT ppx  -Incentive spirometry  -Resume care per primary team    This note and its recommendations herein are preliminary until such time as cosigned by an attending. Urology    Subjective:  Pt resting comfortably. c/o dysuria and mild hematuria  No further R flank pain.  Voided post op.    T(C): 36.6 (08-28-23 @ 16:48), Max: 37.5 (08-28-23 @ 05:09)  HR: 78 (08-28-23 @ 16:48) (71 - 93)  BP: 129/84 (08-28-23 @ 16:48) (123/80 - 150/87)  RR: 18 (08-28-23 @ 16:48) (10 - 18)  SpO2: 96% (08-28-23 @ 16:48) (94% - 99%)    Physical:  Gen: A&O x3  Abd: Soft ND, NT  Back: No CVAT b/l    63y.o. Male s/p cysto, stent    -Abx  -f/u C&S  -Diet as tolerated  -Pain control prn  -DVT ppx  -Incentive spirometry  -Resume care per primary team    This note and its recommendations herein are preliminary until such time as cosigned by an attending. Urology    Subjective:  Pt resting comfortably. c/o dysuria and mild hematuria  No further R flank pain.  Voided post op.    T(C): 36.6 (08-28-23 @ 16:48), Max: 37.5 (08-28-23 @ 05:09)  HR: 78 (08-28-23 @ 16:48) (71 - 93)  BP: 129/84 (08-28-23 @ 16:48) (123/80 - 150/87)  RR: 18 (08-28-23 @ 16:48) (10 - 18)  SpO2: 96% (08-28-23 @ 16:48) (94% - 99%)    Physical:  Gen: A&O x3  Abd: Soft ND, NT  Back: No CVAT b/l    63y.o. Male s/p cysto, stent    -Abx  -f/u C&S  -Diet as tolerated  -Pain control prn  -DVT ppx  -Incentive spirometry  -Resume care per primary team

## 2023-08-28 NOTE — PROGRESS NOTE ADULT - PROBLEM SELECTOR PLAN 3
Pre-renal due to SVT/Unstable Angina vs Renal Stones  IV Fluids  Trend BMP Pre-renal due to SVT/Unstable Angina vs Renal Stones  IV Fluids  Trend BMP  f/u Cr

## 2023-08-28 NOTE — PROGRESS NOTE ADULT - PROBLEM SELECTOR PLAN 2
P/w with right sided flank pain tender to palpation  Hx of multiple kidney stones; requiring surgery 15 years ago  Renal ultrasound Mild right hydronephrosis. Bilateral nonobstructive renal calculi including 1.7 cm calculus in the   upper pole of the right kidney.  Based on location (upper pole) size, less than <20mm; will treat conservatively with IV fluids, pain medications  [ ] Tamsulosin  0.4mg  [ ] Pain medications Dilaudid; holding NSAIDs in the setting of ASHLEY  [ ] UA  [ ] Urology consulted: appreciate recs; ordered CT Abdomen Pelvis No Contrast P/w with right sided flank pain tender to palpation  Hx of multiple kidney stones; requiring surgery 15 years ago  Renal ultrasound Mild right hydronephrosis. Bilateral nonobstructive renal calculi including 1.7 cm calculus in the   upper pole of the right kidney.    CTAP non con 3.7 x 2.0 cm staghorn type calculus within the upper pole of the right kidney. Adjacent cystic area upper pole right kidney containing 4.2 mm calcification. Moderate right hydronephrosis appears secondary to a 1.4 x 0.7 cm stone within the midportion of the right ureter. No perinephric fluid collections.   7.8 mm nonobstructive stone midportion left kidney. No hydronephrosis. No left ureteral stones.  S/p R ureteral stent placement  cloudy urine post op, f/u UCx and UA s/p 1g IV ceft  Bloody urine- hold heparin and aspirin    flomax on d/c

## 2023-08-28 NOTE — PATIENT PROFILE ADULT - NSTRANSFERBELONGINGSDISPO_GEN_A_NUR
June 25, 2018      Anant Ortzimary  [address] [facility] [address]        Dear ,    We are writing to inform you of your test results.  Anant, Lab work is complete and shows cholesterol levels have gotten somewhat worse since check last year. Be sure that you are taking your medication as prescribed, monitor diet and follow up for a recheck in 1 year.   Your fasting blood sugar is borderline, I would have you limit carbohydrates and sugar in your diet and follow up for further lab evaluation for diabetes or pre diabetes. Kidney function, liver function and electrolytes are within normal limits.   Your prostate antigen is within normal limits.   Your blood cell counts are normal with no evidence of anemia.   Follow up for further evaluation of blood sugar in 3 months       Resulted Orders   Lipid panel reflex to direct LDL Fasting   Result Value Ref Range    Cholesterol 209 (H) <200 mg/dL      Comment:      Desirable:       <200 mg/dl    Triglycerides 129 <150 mg/dL      Comment:      Fasting specimen    HDL Cholesterol 54 >39 mg/dL    LDL Cholesterol Calculated 129 (H) <100 mg/dL      Comment:      Above desirable:  100-129 mg/dl  Borderline High:  130-159 mg/dL  High:             160-189 mg/dL  Very high:       >189 mg/dl      Non HDL Cholesterol 155 (H) <130 mg/dL      Comment:      Above Desirable:  130-159 mg/dl  Borderline high:  160-189 mg/dl  High:             190-219 mg/dl  Very high:       >219 mg/dl     Comprehensive metabolic panel   Result Value Ref Range    Sodium 140 133 - 144 mmol/L    Potassium 4.2 3.4 - 5.3 mmol/L    Chloride 105 94 - 109 mmol/L    Carbon Dioxide 26 20 - 32 mmol/L    Anion Gap 9 3 - 14 mmol/L    Glucose 106 (H) 70 - 99 mg/dL      Comment:      Fasting specimen    Urea Nitrogen 20 7 - 30 mg/dL    Creatinine 1.11 0.66 - 1.25 mg/dL    GFR Estimate 69 >60 mL/min/1.7m2      Comment:      Non  GFR Calc    GFR Estimate If Black 83 >60 mL/min/1.7m2       Comment:       GFR Calc    Calcium 9.1 8.5 - 10.1 mg/dL    Bilirubin Total 0.7 0.2 - 1.3 mg/dL    Albumin 4.8 3.4 - 5.0 g/dL    Protein Total 8.0 6.8 - 8.8 g/dL    Alkaline Phosphatase 64 40 - 150 U/L    ALT 34 0 - 70 U/L    AST 26 0 - 45 U/L   PSA, screen   Result Value Ref Range    PSA 0.91 0 - 4 ug/L      Comment:      Assay Method:  Chemiluminescence using Siemens Vista analyzer   CBC with platelets   Result Value Ref Range    WBC 8.2 4.0 - 11.0 10e9/L    RBC Count 5.26 4.4 - 5.9 10e12/L    Hemoglobin 16.2 13.3 - 17.7 g/dL    Hematocrit 48.5 40.0 - 53.0 %    MCV 92 78 - 100 fl    MCH 30.8 26.5 - 33.0 pg    MCHC 33.4 31.5 - 36.5 g/dL    RDW 13.0 10.0 - 15.0 %    Platelet Count 172 150 - 450 10e9/L       If you have any questions or concerns, please call the clinic at the number listed above.       Sincerely,        Anna Marie Omalley PA-C                 with patient

## 2023-08-28 NOTE — CHART NOTE - NSCHARTNOTEFT_GEN_A_CORE
UROLOGY CHART NOTE    CT AP noted showing R staghorn calculus. Patient booked as add-on in OR for R ureteral stent placement tomorrow 8/28/23. Please keep patient NPO until procedure. No contraindication to c/w ASA and SQH. Please call for any further concerns.
Patient is s/p R ureteral stent placement. In the OR, after stent placement patients urine was found to be cloudy concerning for possible underlying infection. Urine culture was sent in OR which we will follow. Ancef was given intraoperatively but please give patient ceftriaxone 1g until urine culture from OR results.    [facility]  [address] [place], [place] [id number] [phone]

## 2023-08-28 NOTE — PROGRESS NOTE ADULT - ASSESSMENT
64yo M with a 1.4cm mid-ureteral stone found to have R flank pain    Recommendations  - AM labs reviewed  - OR today for R ureteral stent placement  - NPO for OR today  - Please send UA and urine culture  - will give antibiotics in the OR  - AM BMP pending  - Trend Cr  - Discussed with Dr. Crawford 64yo M with a 1.4cm right mid-ureteral stone found to have R flank pain    Recommendations  - AM labs reviewed  - OR today for R ureteral stent placement  - NPO for OR today  - Please send UA and urine culture  - will give antibiotics in the OR  - AM BMP pending  - Trend Cr  - Discussed with house staff

## 2023-08-28 NOTE — PROGRESS NOTE ADULT - PROBLEM SELECTOR PLAN 1
Patient presents with chest pain dizziness and weakness concerning for unstable angina; never happened before  No hx of CAD, does not follow cardiologist  EKG shows some T Wave inversions leads III, aVF  SVT resolved on admission  Admit to Telemetry  ECHO  Trend Troponin Q8  Cardiology Consult: Amin Patient presents with chest pain dizziness and weakness concerning for unstable angina; never happened before  No hx of CAD, does not follow cardiologist  EKG shows some T Wave inversions leads III, aVF  SVT resolved on admission  Admit to Telemetry  ECHO pending  Trend Troponin Q8  Cardiology Consult: Amin

## 2023-08-28 NOTE — PROGRESS NOTE ADULT - SUBJECTIVE AND OBJECTIVE BOX
PGY-1 Progress Note discussed with attending    PAGER #: [] TILL 5:00 PM  PLEASE CONTACT ON CALL TEAM:  - On Call Team (Please refer to Magy) FROM 5:00 PM - 8:30PM  - Nightfloat Team FROM 8:30 -7:30 AM    CHIEF COMPLAINT & BRIEF HOSPITAL COURSE:    INTERVAL HPI/OVERNIGHT EVENTS:       REVIEW OF SYSTEMS:  CONSTITUTIONAL: No fever, weight loss, or fatigue  RESPIRATORY: No cough, wheezing, chills or hemoptysis; No shortness of breath  CARDIOVASCULAR: No chest pain, palpitations, dizziness, or leg swelling  GASTROINTESTINAL: No abdominal pain. No nausea, vomiting, or hematemesis; No diarrhea or constipation. No melena or hematochezia.  GENITOURINARY: No dysuria or hematuria, urinary frequency  NEUROLOGICAL: No headaches, memory loss, loss of strength, numbness, or tremors  SKIN: No itching, burning, rashes, or lesions     Vital Signs Last 24 Hrs  T(C): 36.8 (28 Aug 2023 13:42), Max: 37.5 (28 Aug 2023 05:09)  T(F): 98.2 (28 Aug 2023 13:42), Max: 99.5 (28 Aug 2023 05:09)  HR: 74 (28 Aug 2023 13:42) (71 - 93)  BP: 135/77 (28 Aug 2023 13:42) (123/80 - 164/97)  BP(mean): 94 (28 Aug 2023 12:45) (92 - 101)  RR: 18 (28 Aug 2023 13:42) (10 - 20)  SpO2: 99% (28 Aug 2023 13:42) (94% - 99%)    Parameters below as of 28 Aug 2023 13:42  Patient On (Oxygen Delivery Method): room air        PHYSICAL EXAMINATION:  GENERAL: NAD  HEAD:  Atraumatic, Normocephalic  EYES:  conjunctiva and sclera clear  NECK: Supple, No JVD, Normal thyroid  CHEST/LUNG: Clear to auscultation. Clear to percussion bilaterally; No rales, rhonchi, wheezing, or rubs  HEART: Regular rate and rhythm; No murmurs, rubs, or gallops  ABDOMEN: Soft, Nontender, Nondistended; Bowel sounds present  NERVOUS SYSTEM:  Alert & Oriented X3,    EXTREMITIES:  2+ Peripheral Pulses, No clubbing, cyanosis, or edema  SKIN: warm dry                          15.0   13.91 )-----------( 257      ( 28 Aug 2023 08:28 )             45.8     08-28    134<L>  |  101  |  21<H>  ----------------------------<  110<H>  4.0   |  22  |  1.67<H>    Ca    9.6      28 Aug 2023 08:28  Phos  2.3     08-27  Mg     2.1     08-27    TPro  8.0  /  Alb  3.2<L>  /  TBili  1.1  /  DBili  x   /  AST  58<H>  /  ALT  109<H>  /  AlkPhos  131<H>  08-28    LIVER FUNCTIONS - ( 28 Aug 2023 08:28 )  Alb: 3.2 g/dL / Pro: 8.0 g/dL / ALK PHOS: 131 U/L / ALT: 109 U/L DA / AST: 58 U/L / GGT: x               PT/INR - ( 27 Aug 2023 11:16 )   PT: 10.7 sec;   INR: 0.94 ratio         PTT - ( 27 Aug 2023 11:16 )  PTT:41.8 sec    CAPILLARY BLOOD GLUCOSE      RADIOLOGY & ADDITIONAL TESTS:                   PGY-1 Progress Note discussed with attending    PAGER #: [] TILL 5:00 PM  PLEASE CONTACT ON CALL TEAM:  - On Call Team (Please refer to Magy) FROM 5:00 PM - 8:30PM  - Nightfloat Team FROM 8:30 -7:30 AM    CHIEF COMPLAINT & BRIEF HOSPITAL COURSE:    INTERVAL HPI/OVERNIGHT EVENTS:   No acute overnight events. On bedside exam patient was lying in acute distress due to pain in back.    REVIEW OF SYSTEMS:  CONSTITUTIONAL: No fever, weight loss, or fatigue  RESPIRATORY: No cough, wheezing, chills or hemoptysis; No shortness of breath  CARDIOVASCULAR: No chest pain, palpitations, dizziness, or leg swelling  GASTROINTESTINAL: No abdominal pain. No nausea, vomiting, or hematemesis; No diarrhea or constipation. No melena or hematochezia.  GENITOURINARY: No dysuria or hematuria, urinary frequency. endorses right sided Back pain.  NEUROLOGICAL: No headaches, memory loss, loss of strength, numbness, or tremors  SKIN: No itching, burning, rashes, or lesions     Vital Signs Last 24 Hrs  T(C): 36.8 (28 Aug 2023 13:42), Max: 37.5 (28 Aug 2023 05:09)  T(F): 98.2 (28 Aug 2023 13:42), Max: 99.5 (28 Aug 2023 05:09)  HR: 74 (28 Aug 2023 13:42) (71 - 93)  BP: 135/77 (28 Aug 2023 13:42) (123/80 - 164/97)  BP(mean): 94 (28 Aug 2023 12:45) (92 - 101)  RR: 18 (28 Aug 2023 13:42) (10 - 20)  SpO2: 99% (28 Aug 2023 13:42) (94% - 99%)    Parameters below as of 28 Aug 2023 13:42  Patient On (Oxygen Delivery Method): room air        PHYSICAL EXAMINATION:  GENERAL: NAD  HEAD:  Atraumatic, Normocephalic  CHEST/LUNG: Clear to auscultation.; No rales, rhonchi, wheezing, or rubs  HEART: Regular rate and rhythm; No murmurs, rubs, or gallops  ABDOMEN: Soft, Nontender, Nondistended; Bowel sounds present. Right CVA tenderness.  NERVOUS SYSTEM:  Alert & Oriented X3,    EXTREMITIES:  2+ Peripheral Pulses, No clubbing, cyanosis, or edema  SKIN: warm dry                          15.0   13.91 )-----------( 257      ( 28 Aug 2023 08:28 )             45.8     08-28    134<L>  |  101  |  21<H>  ----------------------------<  110<H>  4.0   |  22  |  1.67<H>    Ca    9.6      28 Aug 2023 08:28  Phos  2.3     08-27  Mg     2.1     08-27    TPro  8.0  /  Alb  3.2<L>  /  TBili  1.1  /  DBili  x   /  AST  58<H>  /  ALT  109<H>  /  AlkPhos  131<H>  08-28    LIVER FUNCTIONS - ( 28 Aug 2023 08:28 )  Alb: 3.2 g/dL / Pro: 8.0 g/dL / ALK PHOS: 131 U/L / ALT: 109 U/L DA / AST: 58 U/L / GGT: x               PT/INR - ( 27 Aug 2023 11:16 )   PT: 10.7 sec;   INR: 0.94 ratio         PTT - ( 27 Aug 2023 11:16 )  PTT:41.8 sec    CAPILLARY BLOOD GLUCOSE      RADIOLOGY & ADDITIONAL TESTS:

## 2023-08-28 NOTE — PROGRESS NOTE ADULT - SUBJECTIVE AND OBJECTIVE BOX
Subjective  Denies fevers, chills, nausea, vomiting, SOB, CP. Patient continues to have R flank pain but says it has decreased it intensity.     Objective    Vital signs  T(F): , Max: 99.5 (08-28-23 @ 05:09)  HR: 93 (08-28-23 @ 05:09)  BP: 134/78 (08-28-23 @ 05:09)  SpO2: 95% (08-28-23 @ 05:09)  Wt(kg): --    Output       Gen: NAD  Abd: soft, nontender, nondistended  : R CVAT    Labs      08-28 @ 08:28    WBC 13.91 / Hct 45.8  / SCr --       08-27 @ 11:16    WBC 14.62 / Hct 51.6  / SCr 2.32           Urine Cx: ?  Blood Cx: ?    Imaging Subjective  Denies fevers, chills, nausea, vomiting, SOB, CP. Patient continues to have R flank pain but says it has decreased.     Objective    Vital signs  T(F): , Max: 99.5 (08-28-23 @ 05:09)  HR: 93 (08-28-23 @ 05:09)  BP: 134/78 (08-28-23 @ 05:09)  SpO2: 95% (08-28-23 @ 05:09)  Wt(kg): --    Output       Gen: NAD  Abd: soft, nontender, nondistended  : R CVAT    Labs      08-28 @ 08:28    WBC 13.91 / Hct 45.8  / SCr --       08-27 @ 11:16    WBC 14.62 / Hct 51.6  / SCr 2.32

## 2023-08-28 NOTE — PACU DISCHARGE NOTE - AIRWAY PATENCY:
Satisfactory · Echo on 06/12/2018 showed EF 55 % to 60 %,GIDD  · Repeat echo today showed EF 40%,G1DD  · Patient does not appear fluid overload on exam   · Continue metoprolol  · Telemetry  · Consult Cardiology  · Daily weight  · Intake and output

## 2023-08-28 NOTE — PATIENT PROFILE ADULT - FUNCTIONAL ASSESSMENT - DAILY ACTIVITY SCORE.
----- Message from Caitlyn Paez sent at 4/6/2022  8:49 AM CDT -----  Contact: patient  Type: Needs Medical Advice  Who Called:  patient  Symptoms (please be specific):  cough, runny nose  How long has patient had these symptoms:  week  Pharmacy name and phone #:    [facility] Call Back Number: [phone] (home)   Additional Information: patient went to - tested for Covid and was negative- is requesting something to help clear this up          24

## 2023-08-29 LAB
ALBUMIN SERPL ELPH-MCNC: 3.1 G/DL — LOW (ref 3.5–5)
ALP SERPL-CCNC: 137 U/L — HIGH (ref 40–120)
ALT FLD-CCNC: 87 U/L DA — HIGH (ref 10–60)
ANION GAP SERPL CALC-SCNC: 8 MMOL/L — SIGNIFICANT CHANGE UP (ref 5–17)
AST SERPL-CCNC: 43 U/L — HIGH (ref 10–40)
BILIRUB SERPL-MCNC: 0.5 MG/DL — SIGNIFICANT CHANGE UP (ref 0.2–1.2)
BUN SERPL-MCNC: 28 MG/DL — HIGH (ref 7–18)
CALCIUM SERPL-MCNC: 9.2 MG/DL — SIGNIFICANT CHANGE UP (ref 8.4–10.5)
CHLORIDE SERPL-SCNC: 105 MMOL/L — SIGNIFICANT CHANGE UP (ref 96–108)
CO2 SERPL-SCNC: 23 MMOL/L — SIGNIFICANT CHANGE UP (ref 22–31)
CREAT SERPL-MCNC: 1.48 MG/DL — HIGH (ref 0.5–1.3)
EGFR: 53 ML/MIN/1.73M2 — LOW
GLUCOSE SERPL-MCNC: 126 MG/DL — HIGH (ref 70–99)
HCT VFR BLD CALC: 45 % — SIGNIFICANT CHANGE UP (ref 39–50)
HGB BLD-MCNC: 14.8 G/DL — SIGNIFICANT CHANGE UP (ref 13–17)
MAGNESIUM SERPL-MCNC: 2.1 MG/DL — SIGNIFICANT CHANGE UP (ref 1.6–2.6)
MCHC RBC-ENTMCNC: 27.6 PG — SIGNIFICANT CHANGE UP (ref 27–34)
MCHC RBC-ENTMCNC: 32.9 GM/DL — SIGNIFICANT CHANGE UP (ref 32–36)
MCV RBC AUTO: 84 FL — SIGNIFICANT CHANGE UP (ref 80–100)
NRBC # BLD: 0 /100 WBCS — SIGNIFICANT CHANGE UP (ref 0–0)
PHOSPHATE SERPL-MCNC: 3.2 MG/DL — SIGNIFICANT CHANGE UP (ref 2.5–4.5)
PLATELET # BLD AUTO: 269 K/UL — SIGNIFICANT CHANGE UP (ref 150–400)
POTASSIUM SERPL-MCNC: 3.9 MMOL/L — SIGNIFICANT CHANGE UP (ref 3.5–5.3)
POTASSIUM SERPL-SCNC: 3.9 MMOL/L — SIGNIFICANT CHANGE UP (ref 3.5–5.3)
PROT SERPL-MCNC: 7.7 G/DL — SIGNIFICANT CHANGE UP (ref 6–8.3)
RBC # BLD: 5.36 M/UL — SIGNIFICANT CHANGE UP (ref 4.2–5.8)
RBC # FLD: 13.5 % — SIGNIFICANT CHANGE UP (ref 10.3–14.5)
SODIUM SERPL-SCNC: 136 MMOL/L — SIGNIFICANT CHANGE UP (ref 135–145)
WBC # BLD: 7.46 K/UL — SIGNIFICANT CHANGE UP (ref 3.8–10.5)
WBC # FLD AUTO: 7.46 K/UL — SIGNIFICANT CHANGE UP (ref 3.8–10.5)

## 2023-08-29 PROCEDURE — 99232 SBSQ HOSP IP/OBS MODERATE 35: CPT

## 2023-08-29 PROCEDURE — 99232 SBSQ HOSP IP/OBS MODERATE 35: CPT | Mod: GC

## 2023-08-29 RX ORDER — ASPIRIN/CALCIUM CARB/MAGNESIUM 324 MG
81 TABLET ORAL DAILY
Refills: 0 | Status: DISCONTINUED | OUTPATIENT
Start: 2023-08-29 | End: 2023-08-30

## 2023-08-29 RX ADMIN — TAMSULOSIN HYDROCHLORIDE 0.4 MILLIGRAM(S): 0.4 CAPSULE ORAL at 21:23

## 2023-08-29 RX ADMIN — ATORVASTATIN CALCIUM 10 MILLIGRAM(S): 80 TABLET, FILM COATED ORAL at 21:23

## 2023-08-29 RX ADMIN — Medication 100 MILLIGRAM(S): at 18:37

## 2023-08-29 RX ADMIN — Medication 81 MILLIGRAM(S): at 12:57

## 2023-08-29 RX ADMIN — Medication 100 MILLIGRAM(S): at 05:15

## 2023-08-29 RX ADMIN — CEFTRIAXONE 100 MILLIGRAM(S): 500 INJECTION, POWDER, FOR SOLUTION INTRAMUSCULAR; INTRAVENOUS at 21:23

## 2023-08-29 NOTE — PROGRESS NOTE ADULT - SUBJECTIVE AND OBJECTIVE BOX
Subjective  Denies fevers, chills, nausea, vomiting, SOB, CP. Tolerating diet. Patient has no discomfort related to the stent. Endorsing some hematuria.    Objective    Vital signs  T(F): , Max: 98.8 (08-29-23 @ 01:30)  HR: 74 (08-29-23 @ 10:24)  BP: 122/75 (08-29-23 @ 10:24)  SpO2: 99% (08-29-23 @ 10:24)  Wt(kg): --    Output       Gen: NAD  Abd: soft, nontender, nondistended  : No CVAT bilaterally    Labs      08-29 @ 07:06    WBC 7.46  / Hct 45.0  / SCr 1.48     08-28 @ 08:28    WBC 13.91 / Hct 45.8  / SCr 1.67           Urine Cx: ?  Blood Cx: ?    Imaging

## 2023-08-29 NOTE — PROGRESS NOTE ADULT - SUBJECTIVE AND OBJECTIVE BOX
START [id number] Signs Last 24 Hrs  T(C): 37.1 (08-29-23 @ 10:24), Max: 37.1 (08-29-23 @ 01:30)  T(F): 98.8 (08-29-23 @ 10:24), Max: 98.8 (08-29-23 @ 01:30)  HR: 74 (08-29-23 @ 10:24) (69 - 78)  BP: 122/75 (08-29-23 @ 10:24) (115/76 - 129/84)  BP(mean): --  RR: 18 (08-29-23 @ 10:24) (18 - 18)  SpO2: 99% (08-29-23 @ 10:24) (96% - 99%)    END [id number]  START METABOLICBMI:   HbA1c: A1C with Estimated Average Glucose Result: 6.2 % (08-28-23 @ 08:28)    Glucose: POCT Blood Glucose.: 136 mg/dL (08-27-23 @ 10:57)    BP: 122/75 (08-29-23 @ 10:24) (94/57 - 164/97)  Lipid Panel: Date/Time: 08-28-23 @ 08:28  Cholesterol, Serum: 169  Direct LDL: --  HDL Cholesterol, Serum: 42  Total Cholesterol/HDL Ration Measurement: --  Triglycerides, Serum: 148  END METABOLIC  START LEGALSTATUSEND LEGALSTATUS  START MEDSACTIVEMEDICATIONS  (STANDING):  aspirin  chewable 81 milliGRAM(s) Oral daily  atorvastatin 10 milliGRAM(s) Oral at bedtime  cefTRIAXone   IVPB 1000 milliGRAM(s) IV Intermittent every 24 hours  lactated ringers. 1000 milliLiter(s) (100 mL/Hr) IV Continuous <Continuous>  metoprolol tartrate 100 milliGRAM(s) Oral two times a day  naloxone Injectable 0.4 milliGRAM(s) IV Push once  polyethylene glycol 3350 17 Gram(s) Oral daily  senna 2 Tablet(s) Oral at bedtime  tamsulosin 0.4 milliGRAM(s) Oral at bedtime    MEDICATIONS  (PRN):  acetaminophen     Tablet .. 650 milliGRAM(s) Oral every 6 hours PRN Temp greater or equal to 38C (100.4F), Mild Pain (1 - 3)  aluminum hydroxide/magnesium hydroxide/simethicone Suspension 30 milliLiter(s) Oral every 4 hours PRN Dyspepsia  bisacodyl 5 milliGRAM(s) Oral daily PRN Constipation  melatonin 3 milliGRAM(s) Oral at bedtime PRN Insomnia  ondansetron Injectable 4 milliGRAM(s) IV Push every 8 hours PRN Nausea and/or Vomiting  END MEDSACTIVE  START MEDSPRNMEDICATIONS  (PRN):  acetaminophen     Tablet .. 650 milliGRAM(s) Oral every 6 hours PRN Temp greater or equal to 38C (100.4F), Mild Pain (1 - 3)  aluminum hydroxide/magnesium hydroxide/simethicone Suspension 30 milliLiter(s) Oral every 4 hours PRN Dyspepsia  bisacodyl 5 milliGRAM(s) Oral daily PRN Constipation  melatonin 3 milliGRAM(s) Oral at bedtime PRN Insomnia  ondansetron Injectable 4 milliGRAM(s) IV Push every 8 hours PRN Nausea and/or Vomiting  END MEDSPRN

## 2023-08-29 NOTE — PROGRESS NOTE ADULT - PROBLEM SELECTOR PLAN 2
P/w with right sided flank pain tender to palpation  Hx of multiple kidney stones; requiring surgery 15 years ago  Renal ultrasound Mild right hydronephrosis. Bilateral nonobstructive renal calculi including 1.7 cm calculus in the   upper pole of the right kidney.    CTAP non con 3.7 x 2.0 cm staghorn type calculus within the upper pole of the right kidney. Adjacent cystic area upper pole right kidney containing 4.2 mm calcification. Moderate right hydronephrosis appears secondary to a 1.4 x 0.7 cm stone within the midportion of the right ureter. No perinephric fluid collections.   7.8 mm nonobstructive stone midportion left kidney. No hydronephrosis. No left ureteral stones.  S/p R ureteral stent placement  cloudy urine post op, f/u UCx and UA s/p 1g IV ceft  Bloody urine- hold heparin and aspirin    flomax on d/c P/w with right sided flank pain tender to palpation  Hx of multiple kidney stones; requiring surgery 15 years ago  Renal ultrasound Mild right hydronephrosis. Bilateral nonobstructive renal calculi including 1.7 cm calculus in the   upper pole of the right kidney.    CTAP non con 3.7 x 2.0 cm staghorn type calculus within the upper pole of the right kidney. Adjacent cystic area upper pole right kidney containing 4.2 mm calcification. Moderate right hydronephrosis appears secondary to a 1.4 x 0.7 cm stone within the midportion of the right ureter. No perinephric fluid collections.   7.8 mm nonobstructive stone midportion left kidney. No hydronephrosis. No left ureteral stones.  S/p R ureteral stent placement  cloudy urine post op, f/u UCx and UA s/p 1g IV ceft  aspirin resumed, monitor hematuria    flomax on d/c P/w with right sided flank pain tender to palpation  Hx of multiple kidney stones; requiring surgery 15 years ago  Renal ultrasound Mild right hydronephrosis. Bilateral nonobstructive renal calculi including 1.7 cm calculus in the   upper pole of the right kidney.    CTAP non con 3.7 x 2.0 cm staghorn type calculus within the upper pole of the right kidney. Adjacent cystic area upper pole right kidney containing 4.2 mm calcification. Moderate right hydronephrosis appears secondary to a 1.4 x 0.7 cm stone within the midportion of the right ureter. No perinephric fluid collections.   7.8 mm nonobstructive stone midportion left kidney. No hydronephrosis. No left ureteral stones.  S/p R ureteral stent placement  cloudy urine post op, f/u UA s/p 1g IV ceft  UCx NGTD  aspirin resumed, monitor hematuria    flomax on d/c

## 2023-08-29 NOTE — PROGRESS NOTE ADULT - PROBLEM SELECTOR PLAN 4
Patient takes Losartan   Holding Home Losartan in the setting of ASHLEY
Patient takes Losartan   Holding Home Losartan in the setting of ASHLEY

## 2023-08-29 NOTE — PROGRESS NOTE ADULT - PROBLEM SELECTOR PLAN 3
Pre-renal due to SVT/Unstable Angina vs Renal Stones  IV Fluids  Trend BMP  f/u Cr Pre-renal due to SVT/Unstable Angina vs Renal Stones  IV Fluids  Trend BMP  f/u Cr downtrending

## 2023-08-29 NOTE — PROGRESS NOTE ADULT - ASSESSMENT
63-year-old Turkish speaking male from home with a past medical history of HTN, HLD, nephrolithiasis (stone removal 15 years ago), no known cardiac history, presents to the ED with 1 hour history of chest pain prior to arrival to the ED concerning for unstable angina; found to have non-obstructive kidney stone; admitting for ACS rule out and R nephrolithiasis

## 2023-08-29 NOTE — PROGRESS NOTE ADULT - PROBLEM SELECTOR PLAN 1
Patient presents with chest pain dizziness and weakness concerning for unstable angina; never happened before  No hx of CAD, does not follow cardiologist  EKG shows some T Wave inversions leads III, aVF  SVT resolved on admission  Admit to Telemetry  ECHO pending  Trend Troponin Q8  Cardiology Consult: Amin Patient presents with chest pain dizziness and weakness concerning for unstable angina; never happened before  No hx of CAD, does not follow cardiologist  EKG shows some T Wave inversions leads III, aVF  SVT resolved on admission  ECHO showed Asymmetric thickening of the left ventricular walls, most pronounced in the basal anteroseptum. Differential includes hypertensive cardiomyopathy, vs hypertrophic cardiomyopathy, among others. Consider cardiac MRI if clinically indicated.   f/u cardiology outpaitient  Cardiology Consult: Amin

## 2023-08-30 ENCOUNTER — TRANSCRIPTION ENCOUNTER (OUTPATIENT)
Age: 63
End: 2023-08-30

## 2023-08-30 VITALS
RESPIRATION RATE: 18 BRPM | OXYGEN SATURATION: 99 % | SYSTOLIC BLOOD PRESSURE: 146 MMHG | HEART RATE: 80 BPM | TEMPERATURE: 98 F | DIASTOLIC BLOOD PRESSURE: 87 MMHG

## 2023-08-30 LAB
ALBUMIN SERPL ELPH-MCNC: 3 G/DL — LOW (ref 3.5–5)
ALP SERPL-CCNC: 114 U/L — SIGNIFICANT CHANGE UP (ref 40–120)
ALT FLD-CCNC: 73 U/L DA — HIGH (ref 10–60)
ANION GAP SERPL CALC-SCNC: 6 MMOL/L — SIGNIFICANT CHANGE UP (ref 5–17)
AST SERPL-CCNC: 31 U/L — SIGNIFICANT CHANGE UP (ref 10–40)
BILIRUB SERPL-MCNC: 0.4 MG/DL — SIGNIFICANT CHANGE UP (ref 0.2–1.2)
BUN SERPL-MCNC: 26 MG/DL — HIGH (ref 7–18)
CALCIUM SERPL-MCNC: 9.4 MG/DL — SIGNIFICANT CHANGE UP (ref 8.4–10.5)
CHLORIDE SERPL-SCNC: 108 MMOL/L — SIGNIFICANT CHANGE UP (ref 96–108)
CO2 SERPL-SCNC: 25 MMOL/L — SIGNIFICANT CHANGE UP (ref 22–31)
CREAT SERPL-MCNC: 1.43 MG/DL — HIGH (ref 0.5–1.3)
CULTURE RESULTS: NO GROWTH — SIGNIFICANT CHANGE UP
EGFR: 55 ML/MIN/1.73M2 — LOW
GLUCOSE SERPL-MCNC: 109 MG/DL — HIGH (ref 70–99)
HCT VFR BLD CALC: 47.2 % — SIGNIFICANT CHANGE UP (ref 39–50)
HGB BLD-MCNC: 15.1 G/DL — SIGNIFICANT CHANGE UP (ref 13–17)
MAGNESIUM SERPL-MCNC: 2.1 MG/DL — SIGNIFICANT CHANGE UP (ref 1.6–2.6)
MCHC RBC-ENTMCNC: 27.7 PG — SIGNIFICANT CHANGE UP (ref 27–34)
MCHC RBC-ENTMCNC: 32 GM/DL — SIGNIFICANT CHANGE UP (ref 32–36)
MCV RBC AUTO: 86.4 FL — SIGNIFICANT CHANGE UP (ref 80–100)
NRBC # BLD: 0 /100 WBCS — SIGNIFICANT CHANGE UP (ref 0–0)
PHOSPHATE SERPL-MCNC: 3.1 MG/DL — SIGNIFICANT CHANGE UP (ref 2.5–4.5)
PLATELET # BLD AUTO: 278 K/UL — SIGNIFICANT CHANGE UP (ref 150–400)
POTASSIUM SERPL-MCNC: 4.5 MMOL/L — SIGNIFICANT CHANGE UP (ref 3.5–5.3)
POTASSIUM SERPL-SCNC: 4.5 MMOL/L — SIGNIFICANT CHANGE UP (ref 3.5–5.3)
PROT SERPL-MCNC: 7.5 G/DL — SIGNIFICANT CHANGE UP (ref 6–8.3)
RBC # BLD: 5.46 M/UL — SIGNIFICANT CHANGE UP (ref 4.2–5.8)
RBC # FLD: 13.7 % — SIGNIFICANT CHANGE UP (ref 10.3–14.5)
SODIUM SERPL-SCNC: 139 MMOL/L — SIGNIFICANT CHANGE UP (ref 135–145)
SPECIMEN SOURCE: SIGNIFICANT CHANGE UP
WBC # BLD: 7.23 K/UL — SIGNIFICANT CHANGE UP (ref 3.8–10.5)
WBC # FLD AUTO: 7.23 K/UL — SIGNIFICANT CHANGE UP (ref 3.8–10.5)

## 2023-08-30 PROCEDURE — 85610 PROTHROMBIN TIME: CPT

## 2023-08-30 PROCEDURE — 84100 ASSAY OF PHOSPHORUS: CPT

## 2023-08-30 PROCEDURE — 96361 HYDRATE IV INFUSION ADD-ON: CPT

## 2023-08-30 PROCEDURE — 96374 THER/PROPH/DIAG INJ IV PUSH: CPT

## 2023-08-30 PROCEDURE — 83735 ASSAY OF MAGNESIUM: CPT

## 2023-08-30 PROCEDURE — 87637 SARSCOV2&INF A&B&RSV AMP PRB: CPT

## 2023-08-30 PROCEDURE — 74176 CT ABD & PELVIS W/O CONTRAST: CPT

## 2023-08-30 PROCEDURE — 87086 URINE CULTURE/COLONY COUNT: CPT

## 2023-08-30 PROCEDURE — 76000 FLUOROSCOPY <1 HR PHYS/QHP: CPT

## 2023-08-30 PROCEDURE — 93005 ELECTROCARDIOGRAM TRACING: CPT

## 2023-08-30 PROCEDURE — 84443 ASSAY THYROID STIM HORMONE: CPT

## 2023-08-30 PROCEDURE — 99232 SBSQ HOSP IP/OBS MODERATE 35: CPT

## 2023-08-30 PROCEDURE — C1769: CPT

## 2023-08-30 PROCEDURE — 85025 COMPLETE CBC W/AUTO DIFF WBC: CPT

## 2023-08-30 PROCEDURE — C2617: CPT

## 2023-08-30 PROCEDURE — 80061 LIPID PANEL: CPT

## 2023-08-30 PROCEDURE — 83880 ASSAY OF NATRIURETIC PEPTIDE: CPT

## 2023-08-30 PROCEDURE — 99285 EMERGENCY DEPT VISIT HI MDM: CPT

## 2023-08-30 PROCEDURE — 85730 THROMBOPLASTIN TIME PARTIAL: CPT

## 2023-08-30 PROCEDURE — 82962 GLUCOSE BLOOD TEST: CPT

## 2023-08-30 PROCEDURE — 85027 COMPLETE CBC AUTOMATED: CPT

## 2023-08-30 PROCEDURE — 86803 HEPATITIS C AB TEST: CPT

## 2023-08-30 PROCEDURE — 71045 X-RAY EXAM CHEST 1 VIEW: CPT

## 2023-08-30 PROCEDURE — C1758: CPT

## 2023-08-30 PROCEDURE — 99239 HOSP IP/OBS DSCHRG MGMT >30: CPT | Mod: GC

## 2023-08-30 PROCEDURE — 93306 TTE W/DOPPLER COMPLETE: CPT

## 2023-08-30 PROCEDURE — 76775 US EXAM ABDO BACK WALL LIM: CPT

## 2023-08-30 PROCEDURE — 81001 URINALYSIS AUTO W/SCOPE: CPT

## 2023-08-30 PROCEDURE — C2625: CPT

## 2023-08-30 PROCEDURE — 84484 ASSAY OF TROPONIN QUANT: CPT

## 2023-08-30 PROCEDURE — 36415 COLL VENOUS BLD VENIPUNCTURE: CPT

## 2023-08-30 PROCEDURE — 80053 COMPREHEN METABOLIC PANEL: CPT

## 2023-08-30 PROCEDURE — 83036 HEMOGLOBIN GLYCOSYLATED A1C: CPT

## 2023-08-30 RX ORDER — TAMSULOSIN HYDROCHLORIDE 0.4 MG/1
1 CAPSULE ORAL
Qty: 30 | Refills: 0
Start: 2023-08-30 | End: 2023-09-28

## 2023-08-30 RX ORDER — ATORVASTATIN CALCIUM 80 MG/1
1 TABLET, FILM COATED ORAL
Qty: 30 | Refills: 0
Start: 2023-08-30 | End: 2023-09-28

## 2023-08-30 RX ORDER — METOPROLOL TARTRATE 50 MG
1 TABLET ORAL
Refills: 0 | DISCHARGE

## 2023-08-30 RX ORDER — LOSARTAN/HYDROCHLOROTHIAZIDE 100MG-25MG
1 TABLET ORAL
Refills: 0 | DISCHARGE

## 2023-08-30 RX ORDER — METOPROLOL TARTRATE 50 MG
1 TABLET ORAL
Qty: 60 | Refills: 0
Start: 2023-08-30 | End: 2023-09-28

## 2023-08-30 RX ADMIN — Medication 100 MILLIGRAM(S): at 17:52

## 2023-08-30 RX ADMIN — Medication 81 MILLIGRAM(S): at 12:09

## 2023-08-30 RX ADMIN — Medication 100 MILLIGRAM(S): at 05:33

## 2023-08-30 NOTE — DISCHARGE NOTE PROVIDER - CARE PROVIDERS DIRECT ADDRESSES
,DirectAddress_Unknown,[email or web address]

## 2023-08-30 NOTE — DISCHARGE NOTE NURSING/CASE MANAGEMENT/SOCIAL WORK - NSDCPEFALRISK_GEN_ALL_CORE
For information on Fall & Injury Prevention, visit: [email or web address] OR  [email or web address] OR  https://www.cdc.gov/steadi/patient.html

## 2023-08-30 NOTE — DISCHARGE NOTE PROVIDER - HOSPITAL COURSE
63-year-old Moldovan speaking male from home with a past medical history of HTN, HLD, nephrolithiasis (stone removal 15 years ago), no known cardiac history, presented to the ED with 1 hour history of chest pain prior to arrival to the ED concerning for unstable angina; Patient also complained of back pain and was found to have non-obstructive kidney stone; admitted for ACS rule out and R nephrolithiasis. EKG showed some T Wave inversions leads III, aVF. ECHO showed Asymmetric thickening of the left ventricular walls, most pronounced in the basal anteroseptum. Differential includes hypertensive cardiomyopathy, vs hypertrophic cardiomyopathy, among others. Renal ultrasound showed ild right hydronephrosis. Bilateral nonobstructive renal calculi including 1.7 cm calculus in the upper pole of the right kidney.   63-year-old [place] speaking male from home with a past medical history of HTN, HLD, nephrolithiasis (stone removal 15 years ago), no known cardiac history, presented to the ED with 1 hour history of chest pain prior to arrival to the ED concerning for unstable angina. Patients chest pain resolved on arriving to ED. Patient also complained of 4 days of worsening back pain and was found to have non-obstructive kidney stones on ultrasound; admitted for ACS rule out and R nephrolithiasis. EKG on 8/27 showed some T Wave inversions leads III, aVF. Trops x3 were negative. ECHO on 8/28 showed Asymmetric thickening of the left ventricular walls, most pronounced in the basal anteroseptum. Differential includes hypertensive cardiomyopathy, vs hypertrophic cardiomyopathy, among others. 8/27 Renal ultrasound showed mild right hydronephrosis. Bilateral nonobstructive renal calculi including 1.7 cm calculus in the upper pole of the right kidney. CT Abdomen pelvis 8/27 showed 3.7 x 2.0 cm staghorn type calculus in the upper pole of the right kidney. Adjacent cystic area upper pole right kidney containing 4.2 mm calcification. Home med Losartan was held in setting of ASHLEY. Moderate right hydronephrosis appeared to be secondary to a 1.4 x 0.7 cm stone within the midportion of the right ureter and 7.8 mm nonobstructing stone midportion left kidney. Patient was seen by urology and he was put on NPO and was scheduled for urgent cystoscopy with right ureteral stent placement on 8/28.S/p R ureteral stent placement was performed on 8/28. Cloudy urine was noted post op so patient was started on iv ceftriaxone and urine cultures were taken. s/p 1g IV ceft. Bloody urine was also noted so heparin prophylaxis and aspirin were held. 8/29 patient reported improvement in symptoms and blood in urine had decreased significantly and aspirin was resumed. 8/30 Urine cultures negative 48 hrs. Cr downtrending.      63-year-old Citizen of [place] speaking male from home with a past medical history of HTN, HLD, nephrolithiasis (stone removal 15 years ago), no known cardiac history, presented to the ED with 1 hour history of chest pain prior to arrival to the ED concerning for unstable angina. Patients chest pain resolved on arriving to ED. Patient also complained of 4 days of worsening back pain and was found to have non-obstructive kidney stones on ultrasound; admitted for ACS rule out and R nephrolithiasis. EKG on 8/27 showed some T Wave inversions leads III, aVF. Trops x3 were negative. ECHO on 8/28 showed Asymmetric thickening of the left ventricular walls, most pronounced in the basal anteroseptum. Differential includes hypertensive cardiomyopathy, vs hypertrophic cardiomyopathy, among others. 8/27 Renal ultrasound showed mild right hydronephrosis. Bilateral nonobstructive renal calculi including 1.7 cm calculus in the upper pole of the right kidney. CT Abdomen pelvis 8/27 showed 3.7 x 2.0 cm staghorn type calculus in the upper pole of the right kidney. Adjacent cystic area upper pole right kidney containing 4.2 mm calcification. Home med Losartan was held in setting of ASHLEY. Moderate right hydronephrosis appeared to be secondary to a 1.4 x 0.7 cm stone within the midportion of the right ureter and 7.8 mm non obstructing stone midportion left kidney. Patient was seen by urology and he was put on NPO and was scheduled for urgent cystoscopy with right ureteral stent placement on 8/28.S/p R ureteral stent placement was performed on 8/28. Cloudy urine was noted post op so patient was started on iv ceftriaxone and urine cultures were taken. s/p 1g IV ceft. Bloody urine was also noted so heparin prophylaxis and aspirin were held. 8/29 patient reported improvement in symptoms and blood in urine had decreased significantly and aspirin was resumed. 8/30 Urine cultures negative 48 hrs. Cr downtrending. Patient  medically stable for discharge back to home on Ceftin for completion of antibiotic course.

## 2023-08-30 NOTE — DISCHARGE NOTE PROVIDER - CARE PROVIDER_API CALL
Steve Crawford  Urology  [address] [place] [address]  Phone: [phone]  Fax: [phone]  Follow Up Time: 2 weeks    Ad Dial  Cardiovascular Disease  [address]  [place] [address]  Phone: [phone]  Fax: [phone]  Follow Up Time: 2 weeks

## 2023-08-30 NOTE — DISCHARGE NOTE NURSING/CASE MANAGEMENT/SOCIAL WORK - PATIENT PORTAL LINK FT
You can access the FollowMyHealth Patient Portal offered by [facility] by registering at the following website: [email or web address]. By joining [facility]’s FollowMyHealth portal, you will also be able to view your health information using other applications (apps) compatible with our system.

## 2023-08-30 NOTE — DISCHARGE NOTE PROVIDER - PROVIDER TOKENS
PROVIDER:[TOKEN:[[id number]],FOLLOWUP:[2 weeks]],PROVIDER:[TOKEN:[[id number]],FOLLOWUP:[2 weeks]]

## 2023-08-30 NOTE — DISCHARGE NOTE PROVIDER - ATTENDING DISCHARGE PHYSICAL EXAMINATION:
NAD  AAOx3, no focal deficit   CTABL  Abd soft, non tender, BS present   No renal angle or supra pubic tenderness   BLLE no edema or calf tenderness

## 2023-08-30 NOTE — PROGRESS NOTE ADULT - SUBJECTIVE AND OBJECTIVE BOX
Subjective  Patient is doing well this morning with no complaints. Denies fevers, chills, nausea, vomiting, SOB, CP. Tolerating diet. Still endorsing some hematuria but no passage of clots.     Objective    Vital signs  T(F): , Max: 98.8 (08-29-23 @ 10:24)  HR: 78 (08-30-23 @ 05:26)  BP: 132/84 (08-30-23 @ 05:26)  SpO2: 98% (08-30-23 @ 05:26)  Wt(kg): --    Output       Gen: NAD  Abd: soft, nontender, nondistended  : No CVAT bilaterally.    Labs      08-30 @ 07:53    WBC 7.23  / Hct 47.2  / SCr 1.43     08-29 @ 07:06    WBC 7.46  / Hct 45.0  / SCr 1.48         Culture - Urine (collected 08-28-23 @ 12:05)  Source: OR Collect Bladder (from O.R.)  Preliminary Report (08-29-23 @ 15:20):    No growth to date.        Urine Cx: NGTD  Blood Cx: ?    Imaging

## 2023-08-30 NOTE — PROGRESS NOTE ADULT - ASSESSMENT
64yo M with a 1.4cm right mid-ureteral stone found to have R flank pain    Recommendations  - AM labs reviewed, WBC and Cr stable  - s/p R ureteral stent placement  - Tolerating reg diet  - OR culture NGTD. Patient can be discharged from urologic perspective with 5 days of PO antibiotics. Although OR culture NGTD would still cover with 5 days of PO abx given purulence of urine in OR. Discussed with patient that he will follow with Dr. Crawford outpatient for URS planning.  - urology to sign off, please reconsult as needed  - Discussed with house staff

## 2023-08-30 NOTE — PROGRESS NOTE ADULT - ATTENDING COMMENTS
Patient seen/examined. Agree with above and edited as appropriate.
Patient presented with right flank pain and possible chest pain. Troponin negative x3. No further events on telemetry. Patient today denies chest pain, reports only having back pain. Went to OR today with urology for right stent placement due to obstructing ureteral stone. Intraoperatively noted to have cloudy urine, started on ceftriaxone. UA and CT negative for pyelonephritis. Follow-up urine culture. Creatinine improving, likely dehydration and obstructing stone. Continue with IV Fluids. Hold ACE-I given Acute Kidney Injury. Continue on metoprolol 100mg BID.
Patient was seen and examined at bedside. Reports pain has resolved. Reports he was having hematuria right after the procedure, which has resolved to 20% now    ICU Vital Signs Last 24 Hrs  T(C): 37 (29 Aug 2023 14:04), Max: 37.1 (29 Aug 2023 01:30)  T(F): 98.6 (29 Aug 2023 14:04), Max: 98.8 (29 Aug 2023 01:30)  HR: 78 (29 Aug 2023 14:04) (69 - 78)  BP: 130/80 (29 Aug 2023 14:04) (115/76 - 130/80)  BP(mean): --  ABP: --  ABP(mean): --  RR: 17 (29 Aug 2023 14:04) (17 - 18)  SpO2: 98% (29 Aug 2023 14:04) (97% - 99%)    O2 Parameters below as of 29 Aug 2023 14:04  Patient On (Oxygen Delivery Method): room air      p/E:  NAD  AAOx3, no focal deficit   CTABL  Abd soft, non tender, BS present   No renal angle or supra pubic tenderness   BLLE no edema or calf tenderness     Labs noted     A/P:  TWI noted on admission on inf leads. Started on Aspirin and statin. Monitor hematuria. Hb stable   Follow up urine cx s/p stent. Agree with continuing ceftriaxone   Possible dc tomorrow with cardiology and urology follow up.

## 2023-08-30 NOTE — DISCHARGE NOTE PROVIDER - NSDCCPCAREPLAN_GEN_ALL_CORE_FT
PRINCIPAL DISCHARGE DIAGNOSIS  Diagnosis: Hydronephrosis with obstructing calculus  Assessment and Plan of Treatment: You were experiencing worsening pain on the right side of your back. Imaging showed 2 small non obstructing stones in your right and left kidneys and one large stone in the upper pole of your right kidney that was causing obstruction and enlargement of the kidney. You underwent a procedure to place a stent in the right kidney to relieve the obstruction. After the procedure, your urine was observed to be cloudy, so your urine cultures were taken and you were started on antibiotics.  your symptoms improved after the procedure but you were experiencing grossly bloody urine, so blood thinning medications were stopped. The blood in your urine improved so your blood thinner medication was resumed. After 2 days, your urine cultures came back negative. You are to follow with  a urologist outpatient.      SECONDARY DISCHARGE DIAGNOSES  Diagnosis: Unstable angina  Assessment and Plan of Treatment: You presented with  chest pain that did not get better with rest and associated palpitations with dizziness for one hour prior to arriving in the ED. Your EKG showed abnormalities suggestive of unstable angina. Your troponins were negative. Your chest pain resolved on its own in the ED. You were started on aspirin. You are to continue aspirin and atorvastatin. You are to follow up with a cardiologist outpatient.    Diagnosis: ASHLEY (acute kidney injury)  Assessment and Plan of Treatment: Your kidney function tests were abnormal on admission. secondary to the obstructing stone in your right kidney. After the stent was placed in your right kidney, your kidney function tests began to return to normal. You are to follow up with a nephrologist outpatient to assess for possible chronic kidney disease.    Diagnosis: Abnormal echocardiogram  Assessment and Plan of Treatment: You had an echocardiogram(ultrasound of heart) that showed abnormalities in your heart structure suggestive of cardiomyopathy amongst other possibilities. You are to follow up with a cardiologist outpatient.    Diagnosis: Hypertension  Assessment and Plan of Treatment: You were taking losartan prior to the hospital visit. Your losartan was stopped in the setting of acute kidney injury. You were started on metoprolol 100 mg twice a day in the setting of hypertension with unstable angina. You are to continue taking metoprolol as prescribed and stop taking losartan.    Diagnosis: Hyperlipidemia  Assessment and Plan of Treatment: You are to continue taking atorvastatin as prescribed.     PRINCIPAL DISCHARGE DIAGNOSIS  Diagnosis: Hydronephrosis with obstructing calculus  Assessment and Plan of Treatment: You were experiencing worsening pain on the right side of your back. Imaging showed 2 small non obstructing stones in your right and left kidneys and one large stone in the upper pole of your right kidney that was causing obstruction and enlargement of the kidney. You underwent a procedure to place a stent in the right kidney to relieve the obstruction. After the procedure, your urine was observed to be cloudy, so your urine cultures were taken and you were started on antibiotics.  your symptoms improved after the procedure but you were experiencing grossly bloody urine, so blood thinning medications were stopped. The blood in your urine improved so aspirin was resumed. After 2 days, your urine cultures came back negative. You are to follow with  a urologist outpatient. You will need to complete 5 more days of antibiotics. We also started you on medication tamsulosin that helps with urination. We sent the medication to your pharmacy. Please take the medications as prescribed.      SECONDARY DISCHARGE DIAGNOSES  Diagnosis: Unstable angina  Assessment and Plan of Treatment: You presented with  chest pain that did not get better with rest and associated palpitations with dizziness for one hour prior to arriving in the ED. Your EKG showed abnormalities suggestive of unstable angina. Your troponins were negative. Your chest pain resolved on its own in the ED. You were started on aspirin. You are to continue aspirin and atorvastatin. You are to follow up with a cardiologist outpatient.    Diagnosis: Hypertension  Assessment and Plan of Treatment: You were taking losartan prior to the hospital visit. Your losartan was stopped in the setting of acute kidney injury. You were started on metoprolol 100 mg twice a day in the setting of hypertension with unstable angina. You are to continue taking metoprolol as prescribed and stop taking losartan.    Diagnosis: Hyperlipidemia  Assessment and Plan of Treatment: You are to continue taking atorvastatin as prescribed.    Diagnosis: Abnormal echocardiogram  Assessment and Plan of Treatment: You had an echocardiogram(ultrasound of heart) that showed abnormalities in your heart structure suggestive of cardiomyopathy amongst other possibilities. You are to follow up with a cardiologist outpatient.    Diagnosis: ASHLEY (acute kidney injury)  Assessment and Plan of Treatment: Your kidney function tests were abnormal on admission. secondary to the obstructing stone in your right kidney. After the stent was placed in your right kidney, your kidney function tests began to return to normal. You are to follow up with a nephrologist outpatient to assess for possible chronic kidney disease.     PRINCIPAL DISCHARGE DIAGNOSIS  Diagnosis: Hydronephrosis with obstructing calculus  Assessment and Plan of Treatment: You were experiencing worsening pain on the right side of your back. Imaging showed 2 small non obstructing stones in your right and left kidneys and one large stone in the upper pole of your right kidney that was causing obstruction and enlargement of the kidney. You underwent a procedure to place a stent in the right kidney to relieve the obstruction. After the procedure, your urine was observed to be cloudy, so your urine cultures were taken and you were started on antibiotics.  your symptoms improved after the procedure but you were experiencing grossly bloody urine, so blood thinning medications were stopped. The blood in your urine improved so aspirin was resumed. After 2 days, your urine cultures came back negative. You are to follow with  a urologist outpatient. You will need to complete 5 more days of antibiotics. We also started you on medication tamsulosin that helps with urination. We sent the medication to your pharmacy. Please take the medications as prescribed.      SECONDARY DISCHARGE DIAGNOSES  Diagnosis: Unstable angina  Assessment and Plan of Treatment: You presented with  chest pain that did not get better with rest and associated palpitations with dizziness for one hour prior to arriving in the ED. Your EKG showed abnormalities suggestive of inferior ischemia. Your troponins were negative. Your chest pain resolved on its own in the ED. You were started on aspirin. You are to continue aspirin and atorvastatin. You are to follow up with a cardiologist outpatient as echo showed hypertrophic cardiomyopathy.    Diagnosis: Hypertension  Assessment and Plan of Treatment: You were taking losartan prior to the hospital visit. Your losartan was stopped in the setting of acute kidney injury. You were started on metoprolol 100 mg twice a day in the setting of hypertension with unstable angina. You are to continue taking metoprolol as prescribed and stop taking losartan for now. Follow up with your PCP for revision of your BP meds    Diagnosis: Hyperlipidemia  Assessment and Plan of Treatment: You are to continue taking atorvastatin as prescribed.    Diagnosis: Abnormal echocardiogram  Assessment and Plan of Treatment: You had an echocardiogram(ultrasound of heart) that showed abnormalities in your heart structure suggestive of cardiomyopathy amongst other possibilities. You are to follow up with a cardiologist outpatient.    Diagnosis: ASHLEY (acute kidney injury)  Assessment and Plan of Treatment: Your kidney function tests were abnormal on admission. secondary to the obstructing stone in your right kidney. After the stent was placed in your right kidney, your kidney function tests began to return to normal. You are to follow up with a nephrologist outpatient to assess for possible chronic kidney disease.

## 2023-08-30 NOTE — DISCHARGE NOTE PROVIDER - NSDCMRMEDTOKEN_GEN_ALL_CORE_FT
acetaminophen 500 mg oral tablet: 2 orally prn  aspirin 81 mg oral tablet: 1 orally once a day  gemfibrozil 600 mg oral tablet: 1 orally 2 times a day  losartan-hydrochlorothiazide 50 mg-12.5 mg oral tablet: 1 orally once a day  metoprolol succinate 100 mg oral capsule, extended release: 1 orally once a day  omeprazole 40 mg oral delayed release capsule: 1 orally once a day   acetaminophen 500 mg oral tablet: 2 orally prn  aspirin 81 mg oral tablet: 1 orally once a day  atorvastatin 10 mg oral tablet: 1 tab(s) orally once a day (at bedtime)  cefuroxime 250 mg oral tablet: 1 tab(s) orally 2 times a day  gemfibrozil 600 mg oral tablet: 1 orally 2 times a day  Metoprolol Tartrate 100 mg oral tablet: 1 tab(s) orally 2 times a day  omeprazole 40 mg oral delayed release capsule: 1 orally once a day  tamsulosin 0.4 mg oral capsule: 1 cap(s) orally once a day (at bedtime)

## 2023-08-31 RX ORDER — CEFUROXIME AXETIL 250 MG
1 TABLET ORAL
Qty: 10 | Refills: 0
Start: 2023-08-31 | End: 2023-09-04

## 2023-09-01 PROBLEM — N20.0 CALCULUS OF KIDNEY: Chronic | Status: ACTIVE | Noted: 2023-08-27

## 2023-09-01 PROBLEM — I10 ESSENTIAL (PRIMARY) HYPERTENSION: Chronic | Status: ACTIVE | Noted: 2023-08-27

## 2023-09-20 ENCOUNTER — APPOINTMENT (OUTPATIENT)
Dept: UROLOGY | Facility: CLINIC | Age: 63
End: 2023-09-20
Payer: MEDICAID

## 2023-09-20 VITALS
HEIGHT: 68 IN | BODY MASS INDEX: 27.28 KG/M2 | TEMPERATURE: 95 F | WEIGHT: 180 LBS | DIASTOLIC BLOOD PRESSURE: 95 MMHG | HEART RATE: 65 BPM | SYSTOLIC BLOOD PRESSURE: 165 MMHG | OXYGEN SATURATION: 98 %

## 2023-09-20 DIAGNOSIS — Z78.9 OTHER SPECIFIED HEALTH STATUS: ICD-10-CM

## 2023-09-20 DIAGNOSIS — Z86.79 PERSONAL HISTORY OF OTHER DISEASES OF THE CIRCULATORY SYSTEM: ICD-10-CM

## 2023-09-20 DIAGNOSIS — N13.30 UNSPECIFIED HYDRONEPHROSIS: ICD-10-CM

## 2023-09-20 DIAGNOSIS — Z87.442 PERSONAL HISTORY OF URINARY CALCULI: ICD-10-CM

## 2023-09-20 DIAGNOSIS — N20.0 CALCULUS OF KIDNEY: ICD-10-CM

## 2023-09-20 PROBLEM — Z00.00 ENCOUNTER FOR PREVENTIVE HEALTH EXAMINATION: Status: ACTIVE | Noted: 2023-09-20

## 2023-09-20 PROCEDURE — 99214 OFFICE O/P EST MOD 30 MIN: CPT

## 2023-09-20 RX ORDER — METOPROLOL TARTRATE 100 MG/1
100 TABLET, FILM COATED ORAL
Refills: 0 | Status: ACTIVE | COMMUNITY

## 2023-09-26 DIAGNOSIS — N39.0 URINARY TRACT INFECTION, SITE NOT SPECIFIED: ICD-10-CM

## 2023-09-26 RX ORDER — CIPROFLOXACIN HYDROCHLORIDE 500 MG/1
500 TABLET, FILM COATED ORAL TWICE DAILY
Qty: 28 | Refills: 0 | Status: ACTIVE | COMMUNITY
Start: 2023-09-26 | End: 1900-01-01

## 2023-10-05 ENCOUNTER — OUTPATIENT (OUTPATIENT)
Dept: OUTPATIENT SERVICES | Facility: HOSPITAL | Age: 63
LOS: 1 days | End: 2023-10-05
Payer: MEDICAID

## 2023-10-05 VITALS
WEIGHT: 179.9 LBS | DIASTOLIC BLOOD PRESSURE: 80 MMHG | RESPIRATION RATE: 16 BRPM | TEMPERATURE: 98 F | HEART RATE: 60 BPM | SYSTOLIC BLOOD PRESSURE: 170 MMHG | OXYGEN SATURATION: 100 % | HEIGHT: 66 IN

## 2023-10-05 DIAGNOSIS — Z01.818 ENCOUNTER FOR OTHER PREPROCEDURAL EXAMINATION: ICD-10-CM

## 2023-10-05 DIAGNOSIS — Z78.9 OTHER SPECIFIED HEALTH STATUS: ICD-10-CM

## 2023-10-05 DIAGNOSIS — I10 ESSENTIAL (PRIMARY) HYPERTENSION: ICD-10-CM

## 2023-10-05 DIAGNOSIS — Z98.890 OTHER SPECIFIED POSTPROCEDURAL STATES: Chronic | ICD-10-CM

## 2023-10-05 DIAGNOSIS — N20.2 CALCULUS OF KIDNEY WITH CALCULUS OF URETER: ICD-10-CM

## 2023-10-05 DIAGNOSIS — Z90.49 ACQUIRED ABSENCE OF OTHER SPECIFIED PARTS OF DIGESTIVE TRACT: Chronic | ICD-10-CM

## 2023-10-05 DIAGNOSIS — E78.5 HYPERLIPIDEMIA, UNSPECIFIED: ICD-10-CM

## 2023-10-05 LAB
APPEARANCE UR: ABNORMAL
BACTERIA # UR AUTO: ABNORMAL /HPF
BILIRUB UR-MCNC: NEGATIVE — SIGNIFICANT CHANGE UP
COLOR SPEC: YELLOW — SIGNIFICANT CHANGE UP
COMMENT - URINE: SIGNIFICANT CHANGE UP
DIFF PNL FLD: ABNORMAL
GLUCOSE UR QL: NEGATIVE MG/DL — SIGNIFICANT CHANGE UP
KETONES UR-MCNC: NEGATIVE MG/DL — SIGNIFICANT CHANGE UP
LEUKOCYTE ESTERASE UR-ACNC: ABNORMAL
NITRITE UR-MCNC: NEGATIVE — SIGNIFICANT CHANGE UP
PH UR: 5.5 — SIGNIFICANT CHANGE UP (ref 5–8)
PROT UR-MCNC: 100 MG/DL
RBC CASTS # UR COMP ASSIST: ABNORMAL /HPF
SP GR SPEC: 1.02 — SIGNIFICANT CHANGE UP (ref 1–1.03)
UROBILINOGEN FLD QL: 0.2 MG/DL — SIGNIFICANT CHANGE UP (ref 0.2–1)
WBC UR QL: 20 /HPF — HIGH (ref 0–5)

## 2023-10-05 RX ORDER — GEMFIBROZIL 600 MG
1 TABLET ORAL
Refills: 0 | DISCHARGE

## 2023-10-05 RX ORDER — OMEPRAZOLE 10 MG/1
1 CAPSULE, DELAYED RELEASE ORAL
Refills: 0 | DISCHARGE

## 2023-10-05 NOTE — H&P PST ADULT - NSANTHOBSERVEDRD_ENT_A_CORE
Stop hydrochlorothiazide.    Continue losartan 100 mg one daily    NEW Amlodipine 5 mg take one daily.    Watch for light headedness when standing from a sitting or a lying position  
No

## 2023-10-05 NOTE — H&P PST ADULT - HISTORY OF PRESENT ILLNESS
63 yr [place] male history of HTN HDL nephrolithiasis ( 15 year stone removal) . Pt had gone to this ER with flank pain and had surgery where right stent was placed. Pt seen by urologist and is scheduled for cystoscopy bilateral ureteroscopy with laser lithotripsy stone extraction stent placement and retrograde pyelogram 10/12/2023. Dr Hyman [phone]

## 2023-10-05 NOTE — H&P PST ADULT - ASSESSMENT
63 yr [place] male history of HTN HDL nephrolithiasis ( 15 year stone removal) . Pt had gone to this ER with flank pain and had surgery where right stent was placed. Pt seen by urologist and is scheduled for cystoscopy bilateral ureteroscopy with laser lithotripsy stone extraction stent placement and retrograde pyelogram 10/12/2023. Dr Hyman [phone]

## 2023-10-05 NOTE — H&P PST ADULT - NSICDXFAMILYHX_GEN_ALL_CORE_FT
FAMILY HISTORY:  Father  Still living? Unknown  No pertinent past medical history, Age at diagnosis: Age Unknown    Mother  Still living? No  No pertinent past medical history, Age at diagnosis: Age Unknown

## 2023-10-05 NOTE — H&P PST ADULT - NSICDXPROCEDURE_GEN_ALL_CORE_FT
PROCEDURES:  Ureteroscopy with laser lithotripsy and stent placement 05-Oct-2023 07:48:51  Maylin Vu

## 2023-10-05 NOTE — H&P PST ADULT - NSICDXPASTMEDICALHX_GEN_ALL_CORE_FT
PAST MEDICAL HISTORY:  Calculus of kidney     Calculus of ureter     HTN (hypertension)     Language barrier     Nephrolithiasis

## 2023-10-05 NOTE — H&P PST ADULT - PROBLEM SELECTOR PLAN 4
scheduled for cystoscopy bilateral ureteroscopy with laser lithotripsy stone extraction stent placement and retrograde pyelogram     Pt instructed to be NPO the night before surgery and the morning of surgery except po med. Provided with chlorhexidene 4% solution to wash for 3 days including the morning of surgery, Written instructions reviewed with pt via . Escort required. Tylenol to be used prior to surgery and stop ASA one week before surgery.    Stop Bang score=3 Pt intermediate risk for HILARIO

## 2023-10-06 LAB
CULTURE RESULTS: NO GROWTH — SIGNIFICANT CHANGE UP
SPECIMEN SOURCE: SIGNIFICANT CHANGE UP

## 2023-10-06 PROCEDURE — T1013: CPT

## 2023-10-06 PROCEDURE — G0463: CPT

## 2023-10-11 ENCOUNTER — TRANSCRIPTION ENCOUNTER (OUTPATIENT)
Age: 63
End: 2023-10-11

## 2023-10-12 ENCOUNTER — TRANSCRIPTION ENCOUNTER (OUTPATIENT)
Age: 63
End: 2023-10-12

## 2023-10-12 ENCOUNTER — APPOINTMENT (OUTPATIENT)
Dept: UROLOGY | Facility: HOSPITAL | Age: 63
End: 2023-10-12

## 2023-10-12 ENCOUNTER — RESULT REVIEW (OUTPATIENT)
Age: 63
End: 2023-10-12

## 2023-10-12 ENCOUNTER — OUTPATIENT (OUTPATIENT)
Dept: OUTPATIENT SERVICES | Facility: HOSPITAL | Age: 63
LOS: 1 days | Discharge: HOME | End: 2023-10-12
Payer: MEDICAID

## 2023-10-12 VITALS
RESPIRATION RATE: 16 BRPM | HEART RATE: 70 BPM | TEMPERATURE: 98 F | DIASTOLIC BLOOD PRESSURE: 85 MMHG | SYSTOLIC BLOOD PRESSURE: 170 MMHG | OXYGEN SATURATION: 96 %

## 2023-10-12 VITALS
WEIGHT: 179.9 LBS | HEART RATE: 80 BPM | DIASTOLIC BLOOD PRESSURE: 90 MMHG | OXYGEN SATURATION: 96 % | RESPIRATION RATE: 16 BRPM | SYSTOLIC BLOOD PRESSURE: 165 MMHG | HEIGHT: 66 IN | TEMPERATURE: 98 F

## 2023-10-12 DIAGNOSIS — N20.1 CALCULUS OF URETER: ICD-10-CM

## 2023-10-12 DIAGNOSIS — N20.0 CALCULUS OF KIDNEY: ICD-10-CM

## 2023-10-12 DIAGNOSIS — Z98.890 OTHER SPECIFIED POSTPROCEDURAL STATES: Chronic | ICD-10-CM

## 2023-10-12 DIAGNOSIS — Z90.49 ACQUIRED ABSENCE OF OTHER SPECIFIED PARTS OF DIGESTIVE TRACT: Chronic | ICD-10-CM

## 2023-10-12 PROCEDURE — C1889: CPT

## 2023-10-12 PROCEDURE — 74420 UROGRAPHY RTRGR +-KUB: CPT | Mod: 26

## 2023-10-12 PROCEDURE — 88300 SURGICAL PATH GROSS: CPT | Mod: 26

## 2023-10-12 PROCEDURE — C2617: CPT

## 2023-10-12 PROCEDURE — C1769: CPT

## 2023-10-12 PROCEDURE — 82365 CALCULUS SPECTROSCOPY: CPT

## 2023-10-12 PROCEDURE — 52332 CYSTOSCOPY AND TREATMENT: CPT | Mod: 50

## 2023-10-12 PROCEDURE — 88300 SURGICAL PATH GROSS: CPT

## 2023-10-12 PROCEDURE — 52352 CYSTOURETERO W/STONE REMOVE: CPT | Mod: 50

## 2023-10-12 PROCEDURE — C1758: CPT

## 2023-10-12 PROCEDURE — C1747: CPT

## 2023-10-12 PROCEDURE — 52356 CYSTO/URETERO W/LITHOTRIPSY: CPT | Mod: 50

## 2023-10-12 PROCEDURE — 76000 FLUOROSCOPY <1 HR PHYS/QHP: CPT

## 2023-10-12 DEVICE — URETERAL STENT PERCUFLEX PLUS 6FR 24CM: Type: IMPLANTABLE DEVICE | Status: FUNCTIONAL

## 2023-10-12 DEVICE — URETERAL CATH DUAL LUMEN 10FR 54CM: Type: IMPLANTABLE DEVICE | Status: FUNCTIONAL

## 2023-10-12 DEVICE — URETERAL CATH FLEXIMA OPEN END 5FR 70CM: Type: IMPLANTABLE DEVICE | Status: FUNCTIONAL

## 2023-10-12 DEVICE — GUIDEWIRE SENSOR DUAL-FLEX NITINOL STRAIGHT .035" X 150CM: Type: IMPLANTABLE DEVICE | Status: FUNCTIONAL

## 2023-10-12 DEVICE — STONE BASKET ZEROTIP NITINOL 4-WIRE 1.9FR 120CM X 12MM: Type: IMPLANTABLE DEVICE | Status: FUNCTIONAL

## 2023-10-12 DEVICE — URETERAL SHEATH NAVIGATOR HD 12/14FR X 36CM: Type: IMPLANTABLE DEVICE | Status: FUNCTIONAL

## 2023-10-12 DEVICE — URETEROSCOPE LITHOVUE DISP: Type: IMPLANTABLE DEVICE | Status: FUNCTIONAL

## 2023-10-12 RX ORDER — CIPROFLOXACIN LACTATE 400MG/40ML
1 VIAL (ML) INTRAVENOUS
Qty: 10 | Refills: 0
Start: 2023-10-12 | End: 2023-10-16

## 2023-10-12 RX ORDER — ACETAMINOPHEN 500 MG
650 TABLET ORAL EVERY 6 HOURS
Refills: 0 | Status: DISCONTINUED | OUTPATIENT
Start: 2023-10-12 | End: 2023-10-26

## 2023-10-12 RX ORDER — FENTANYL CITRATE 50 UG/ML
50 INJECTION INTRAVENOUS
Refills: 0 | Status: DISCONTINUED | OUTPATIENT
Start: 2023-10-12 | End: 2023-10-12

## 2023-10-12 RX ORDER — ACETAMINOPHEN 500 MG
2 TABLET ORAL
Refills: 0 | DISCHARGE

## 2023-10-12 RX ORDER — IBUPROFEN 200 MG
400 TABLET ORAL ONCE
Refills: 0 | Status: DISCONTINUED | OUTPATIENT
Start: 2023-10-12 | End: 2023-10-26

## 2023-10-12 RX ORDER — SODIUM CHLORIDE 9 MG/ML
3 INJECTION INTRAMUSCULAR; INTRAVENOUS; SUBCUTANEOUS EVERY 8 HOURS
Refills: 0 | Status: DISCONTINUED | OUTPATIENT
Start: 2023-10-12 | End: 2023-10-12

## 2023-10-12 RX ORDER — FENTANYL CITRATE 50 UG/ML
25 INJECTION INTRAVENOUS
Refills: 0 | Status: DISCONTINUED | OUTPATIENT
Start: 2023-10-12 | End: 2023-10-12

## 2023-10-12 RX ORDER — ONDANSETRON 8 MG/1
4 TABLET, FILM COATED ORAL ONCE
Refills: 0 | Status: DISCONTINUED | OUTPATIENT
Start: 2023-10-12 | End: 2023-10-12

## 2023-10-12 RX ORDER — ASPIRIN/CALCIUM CARB/MAGNESIUM 324 MG
1 TABLET ORAL
Refills: 0 | DISCHARGE

## 2023-10-12 NOTE — ASU PATIENT PROFILE, ADULT - REASON FOR ADMISSION, PROFILE
cystoscopy bilateral ureteroscopy with laser lithotripsy stone extraction stent placement and retrograde pyelogram 10/12/2023.

## 2023-10-12 NOTE — ASU DISCHARGE PLAN (ADULT/PEDIATRIC) - CARE PROVIDER_API CALL
Steve Crawford  Urology  [address] [place] [address]  Phone: [phone]  Fax: [phone]  Follow Up Time:

## 2023-10-12 NOTE — BRIEF OPERATIVE NOTE - NSICDXBRIEFPROCEDURE_GEN_ALL_CORE_FT
PROCEDURES:  Cystoscopy with bilateral ureteroscopy, retrograde pyelography, extraction of urinary calculus and insertion of ureteral stent 12-Oct-2023 11:08:12  Isa Schmid

## 2023-10-12 NOTE — ASU PREOP CHECKLIST - HEIGHT IN FEET
[facility] records updated, confirmed patient is  as of 7/15/2020 at [facility].    5

## 2023-10-12 NOTE — BRIEF OPERATIVE NOTE - NSICDXBRIEFPOSTOP_GEN_ALL_CORE_FT
POST-OP DIAGNOSIS:  Right ureteral stone 12-Oct-2023 11:08:58  Isa Schmid  Right kidney stone 12-Oct-2023 11:09:07  Isa Schmid  Kidney stone on left side 12-Oct-2023 11:09:15  Isa Schmid

## 2023-10-12 NOTE — ASU DISCHARGE PLAN (ADULT/PEDIATRIC) - PROCEDURE
cystoscopy, bilateral ureteroscopy, retrograde pyelogram, laser lithotripsy, basket stone extraction, right ureteral stent exchange, left ureteral stent insertion

## 2023-10-12 NOTE — BRIEF OPERATIVE NOTE - OPERATION/FINDINGS
cystoscopy, bilateral ureteroscopy, retrograde pyelogram, laser lithotripsy, basket stone extraction, right ureteral stent exchange, left ureteral stent insertion.

## 2023-10-12 NOTE — ASU DISCHARGE PLAN (ADULT/PEDIATRIC) - NS MD DC FALL RISK RISK
For information on Fall & Injury Prevention, visit: [email or web address] OR  [email or web address] OR  https://www.cdc.gov/steadi/patient.html

## 2023-10-12 NOTE — BRIEF OPERATIVE NOTE - NSICDXBRIEFPREOP_GEN_ALL_CORE_FT
PRE-OP DIAGNOSIS:  Right ureteral stone 12-Oct-2023 11:08:30  Isa Schmid  Right kidney stone 12-Oct-2023 11:08:38  Isa Schmid  Kidney stone on left side 12-Oct-2023 11:08:46  Isa Schmid

## 2023-10-13 PROBLEM — N20.1 CALCULUS OF URETER: Chronic | Status: ACTIVE | Noted: 2023-10-05

## 2023-10-13 PROBLEM — Z78.9 OTHER SPECIFIED HEALTH STATUS: Chronic | Status: ACTIVE | Noted: 2023-10-05

## 2023-10-13 PROBLEM — N20.0 CALCULUS OF KIDNEY: Chronic | Status: ACTIVE | Noted: 2023-10-05

## 2023-10-18 ENCOUNTER — APPOINTMENT (OUTPATIENT)
Dept: UROLOGY | Facility: CLINIC | Age: 63
End: 2023-10-18
Payer: MEDICAID

## 2023-10-18 VITALS
HEART RATE: 84 BPM | HEIGHT: 68 IN | WEIGHT: 192 LBS | TEMPERATURE: 96.7 F | BODY MASS INDEX: 29.1 KG/M2 | DIASTOLIC BLOOD PRESSURE: 84 MMHG | SYSTOLIC BLOOD PRESSURE: 134 MMHG | OXYGEN SATURATION: 98 %

## 2023-10-18 DIAGNOSIS — N20.0 CALCULUS OF KIDNEY: ICD-10-CM

## 2023-10-18 DIAGNOSIS — N20.1 CALCULUS OF URETER: ICD-10-CM

## 2023-10-18 PROCEDURE — 52315 CYSTOSCOPY AND TREATMENT: CPT

## 2023-10-23 LAB
CELL MATERIAL STONE EST-MCNT: SIGNIFICANT CHANGE UP
LABORATORY COMMENT REPORT: SIGNIFICANT CHANGE UP
NIDUS STONE QN: SIGNIFICANT CHANGE UP

## 2023-11-10 ENCOUNTER — NON-APPOINTMENT (OUTPATIENT)
Age: 63
End: 2023-11-10

## 2023-11-10 LAB
SURGICAL PATHOLOGY STUDY: SIGNIFICANT CHANGE UP
SURGICAL PATHOLOGY STUDY: SIGNIFICANT CHANGE UP

## 2023-12-01 ENCOUNTER — APPOINTMENT (OUTPATIENT)
Dept: UROLOGY | Facility: CLINIC | Age: 63
End: 2023-12-01

## 2024-05-01 NOTE — H&P ADULT - PROBLEM/PLAN-6
Pt here for Venofer 3/3 . Pt denies any issues or concerns.      Ordering MD: Moira  Order Exp: After today's dose.      Pt tolerated infusion without difficulty or complaint. VSS post infusion, no s&s of reaction noted. PIV removed, gauze and coban applied.   Reviewed next apt date/time: 8/1 for lab and MD follow up.       Education Record  Learner:  Patient  Disease / Diagnosis: DEVEN.  Barriers / Limitations:  None  Method:  Reinforcement  General Topics:  Plan of care reviewed  Outcome:  Shows understanding        
DISPLAY PLAN FREE TEXT

## (undated) DEVICE — ELCTR GROUNDING PAD ADULT COVIDIEN

## (undated) DEVICE — TUBING TUR 2 PRONG

## (undated) DEVICE — WARMING BLANKET UPPER ADULT

## (undated) DEVICE — VENODYNE/SCD SLEEVE CALF MEDIUM

## (undated) DEVICE — PACK CYSTO

## (undated) DEVICE — VENODYNE/SCD SLEEVE CALF LARGE

## (undated) DEVICE — CABLE DAC ACTIVE CORD

## (undated) DEVICE — IRR BULB PATHFINDER + 10"

## (undated) DEVICE — GLV 7.5 PROTEXIS (WHITE)

## (undated) DEVICE — GOWN XL

## (undated) DEVICE — SOL IRR BAG NS 0.9% 3000ML

## (undated) DEVICE — SYR CATH TIP 2 OZ

## (undated) DEVICE — ACMI SELF-SEALING SEAL UP TO 7FR

## (undated) DEVICE — GLV 8 PROTEXIS (WHITE)

## (undated) DEVICE — GLV 7 PROTEXIS (WHITE)

## (undated) DEVICE — SOL IRR BAG H2O 3000ML

## (undated) DEVICE — VISITEC 4X4

## (undated) DEVICE — POSITIONER HEAD REST PRONE

## (undated) DEVICE — SOL IRR POUR NS 0.9% 1500ML

## (undated) DEVICE — SOL IRR POUR NS 0.9% 500ML

## (undated) DEVICE — POSITIONER FOAM EGG CRATE ULNAR 2PCS (PINK)

## (undated) DEVICE — SYR ASEPTO

## (undated) DEVICE — FOLEY HOLDER STATLOCK 2 WAY ADULT

## (undated) DEVICE — FOLEY TRAY 16FR 5CC LTX UMETER CLOSED

## (undated) DEVICE — TUBING RANGER FLUID IRRIGATION SET DISP

## (undated) DEVICE — DRAPE DRAINAGE BAG URO CATCH II

## (undated) DEVICE — LAP PAD W RING 18 X 18"

## (undated) DEVICE — DRAPE EQUIPMENT BANDED BAG 30 X 30" (SHOWER CAP)

## (undated) DEVICE — DRAPE C ARM UNIVERSAL

## (undated) DEVICE — SOL IRR POUR H2O 1500ML

## (undated) DEVICE — GLV 6.5 PROTEXIS (WHITE)

## (undated) DEVICE — BOSTON SCIENTIFIC UROLOK II SCOPE ADAPTOR

## (undated) DEVICE — PREP BETADINE SPONGE STICKS